# Patient Record
Sex: FEMALE | Race: WHITE | Employment: OTHER | URBAN - METROPOLITAN AREA
[De-identification: names, ages, dates, MRNs, and addresses within clinical notes are randomized per-mention and may not be internally consistent; named-entity substitution may affect disease eponyms.]

---

## 2017-01-10 DIAGNOSIS — F41.9 ANXIETY: ICD-10-CM

## 2017-01-12 RX ORDER — LORAZEPAM 1 MG/1
TABLET ORAL
Qty: 180 TAB | Refills: 1 | OUTPATIENT
Start: 2017-01-12 | End: 2017-07-12 | Stop reason: SDUPTHER

## 2017-02-21 ENCOUNTER — HOSPITAL ENCOUNTER (OUTPATIENT)
Dept: LAB | Age: 82
Discharge: HOME | End: 2017-02-21
Payer: MEDICARE

## 2017-02-21 PROCEDURE — 36415 COLL VENOUS BLD VENIPUNCTURE: CPT

## 2017-02-21 PROCEDURE — 84443 ASSAY THYROID STIM HORMONE: CPT

## 2017-02-21 PROCEDURE — 85025 COMPLETE CBC W/AUTO DIFF WBC: CPT

## 2017-02-21 PROCEDURE — 82306 VITAMIN D 25 HYDROXY: CPT

## 2017-02-21 PROCEDURE — 80061 LIPID PANEL: CPT

## 2017-02-21 PROCEDURE — 80053 COMPREHEN METABOLIC PANEL: CPT

## 2017-03-07 ENCOUNTER — HOSPITAL ENCOUNTER (OUTPATIENT)
Dept: LAB | Age: 82
Discharge: HOME | End: 2017-03-07
Payer: MEDICARE

## 2017-03-07 ENCOUNTER — OFFICE VISIT (OUTPATIENT)
Dept: INTERNAL MEDICINE CLINIC | Age: 82
End: 2017-03-07

## 2017-03-07 VITALS
WEIGHT: 110.2 LBS | RESPIRATION RATE: 18 BRPM | OXYGEN SATURATION: 98 % | SYSTOLIC BLOOD PRESSURE: 114 MMHG | DIASTOLIC BLOOD PRESSURE: 74 MMHG | BODY MASS INDEX: 22.22 KG/M2 | HEIGHT: 59 IN | TEMPERATURE: 97.2 F | HEART RATE: 96 BPM

## 2017-03-07 DIAGNOSIS — N39.498 OTHER URINARY INCONTINENCE: ICD-10-CM

## 2017-03-07 DIAGNOSIS — Z00.00 MEDICARE ANNUAL WELLNESS VISIT, SUBSEQUENT: Primary | ICD-10-CM

## 2017-03-07 DIAGNOSIS — E78.00 HYPERCHOLESTEROLEMIA: ICD-10-CM

## 2017-03-07 DIAGNOSIS — F41.9 ANXIETY: ICD-10-CM

## 2017-03-07 DIAGNOSIS — Z23 ENCOUNTER FOR IMMUNIZATION: ICD-10-CM

## 2017-03-07 DIAGNOSIS — N30.01 ACUTE CYSTITIS WITH HEMATURIA: ICD-10-CM

## 2017-03-07 DIAGNOSIS — D50.8 OTHER IRON DEFICIENCY ANEMIA: ICD-10-CM

## 2017-03-07 LAB
BILIRUB UR QL STRIP: NEGATIVE
GLUCOSE UR-MCNC: NEGATIVE MG/DL
KETONES P FAST UR STRIP-MCNC: NEGATIVE MG/DL
PH UR STRIP: 7.5 [PH] (ref 4.6–8)
PROT UR QL STRIP: NORMAL MG/DL
SP GR UR STRIP: 1.02 (ref 1–1.03)
UA UROBILINOGEN AMB POC: NORMAL (ref 0.2–1)
URINALYSIS CLARITY POC: NORMAL
URINALYSIS COLOR POC: YELLOW
URINE BLOOD POC: NORMAL
URINE LEUKOCYTES POC: NORMAL
URINE NITRITES POC: POSITIVE

## 2017-03-07 PROCEDURE — 87086 URINE CULTURE/COLONY COUNT: CPT

## 2017-03-07 PROCEDURE — 87186 SC STD MICRODIL/AGAR DIL: CPT

## 2017-03-07 PROCEDURE — 87077 CULTURE AEROBIC IDENTIFY: CPT

## 2017-03-07 RX ORDER — CIPROFLOXACIN 250 MG/1
250 TABLET, FILM COATED ORAL 2 TIMES DAILY
Qty: 14 TAB | Refills: 0 | Status: SHIPPED | OUTPATIENT
Start: 2017-03-07 | End: 2017-03-14

## 2017-03-07 NOTE — PATIENT INSTRUCTIONS
It was a pleasure to see you! As discussed:    Lab Review  Your anemia level is stable. We will repeat the labs before your next visit. Complete the stool test   Your thyroid studies are at goal. Continue your current levothyroxine dose. We will recheck the levels in 6-12 months or sooner if you develop symptoms or sudden weight changes. The remainder of your labs were normal. Some labs that may have been tested and their explanation are:  Your electrolytes, kidney & liver function (Metabolic Panel)   Anemia, blood cells (CBC)  Thyroid (TSH + T4, T3)  Hormones (prolactin, vitamin D )   Diabetes (Hemoglobin A1c)     You have a  UTI. Take the antibiotics as prescribed. I've sent  A culture of the urine to find out what bacteria is causing your symptoms and if the current antibiotics will be effective. Complete urine test in lab after treatment is done    If we need to change your medication,our office will call you. Health Maintenance   We discussed decreased mammograms to every two years continue self breast exams. Urge Incontinence in Women: Care Instructions  Your Care Instructions  Urge incontinence occurs when the need to urinate is so strong that you cannot reach the toilet in time, even when your bladder contains only a small amount of urine. This is also called overactive bladder or unstable bladder. Some women may have no warning before they leak urine. This condition does not cause major health problems, but it can be embarrassing and can affect a woman's self-esteem and confidence. Treatment can cure or improve your symptoms. Follow-up care is a key part of your treatment and safety. Be sure to make and go to all appointments, and call your doctor if you are having problems. It's also a good idea to know your test results and keep a list of the medicines you take. How can you care for yourself at home? · Take your medicines exactly as prescribed.  Call your doctor if you think you are having a problem with your medicine. You will get more details on the specific medicines your doctor prescribes. · Limit caffeine and alcoholthey stimulate urine production. · Urinate every 2 to 4 hours during waking hours, even if you feel that you do not have to go. · Do pelvic floor (Kegel) exercises, which tighten and strengthen pelvic muscles. To do Kegel exercises:  ¨ Squeeze the same muscles you would use to stop your urine. Your belly and thighs should not move. ¨ Hold the squeeze for 3 seconds, then relax for 3 seconds. ¨ Start with 3 seconds. Then add 1 second each week until you are able to squeeze for 10 seconds. ¨ Repeat the exercise 10 to 15 times for each session. Do three or more sessions each day. · Try wearing pads that absorb the leaks. · Keep skin in the genital area dry. When should you call for help? Call your doctor now or seek immediate medical care if:  · You have symptoms of a urinary infection. For example:  ¨ You have blood or pus in your urine. ¨ You have pain in your back just below your rib cage. This is called flank pain. ¨ You have a fever, chills, or body aches. ¨ It hurts to urinate. ¨ You have groin or belly pain. Watch closely for changes in your health, and be sure to contact your doctor if:  · You have a hard time urinating when your bladder feels full. · You feel like you need to urinate often. · Your bladder feels full even after you urinate. · Your symptoms are getting worse. Where can you learn more? Go to http://nikolay-kendra.info/. Enter X590 in the search box to learn more about \"Urge Incontinence in Women: Care Instructions. \"  Current as of: February 25, 2016  Content Version: 11.1  © 8036-9545 [employer]. Care instructions adapted under license by [employer] (which disclaims liability or warranty for this information).  If you have questions about a medical condition or this instruction, always ask your healthcare professional. [place] [address] any warranty or liability for your use of this information.

## 2017-03-07 NOTE — LETTER
3/7/2017 11:14 AM 
 
Ms. Mariusz Almaguer [address] [facility] [id number] Dear Mariusz Mcmahan It was a pleasure to see you during your recent visit. Thank you for completing your labs. Please find your most recent results below. Your results show: 
Lab Review Your anemia level is stable. We will repeat the labs before your next visit. Complete the stool test  
Your thyroid studies are at goal. Continue your current levothyroxine dose. We will recheck the levels in 6-12 months or sooner if you develop symptoms or sudden weight changes. The remainder of your labs were normal. Some labs that may have been tested and their explanation are: 
Your electrolytes, kidney & liver function (Metabolic Panel) Anemia, blood cells (CBC) Thyroid (TSH + T4, T3) Hormones (prolactin, vitamin D ) Diabetes (Hemoglobin A1c) Remember to sign up for [facility] so we can communicate via email and you can view your records on line. Do not hesitate to contact the office if you have any questions or concerns before your next appointment. Kind regards,  
 
 
---- Sabi Hamilton MD 
Internal Medicine/ Primary Care [facility] Internal Medicine [place] [address], [address] Office: [phone] Fax: [phone] Resulted Orders METABOLIC PANEL, COMPREHENSIVE Result Value Ref Range Glucose 81 65 - 99 mg/dL BUN 19 8 - 27 mg/dL Creatinine 0.87 0.57 - 1.00 mg/dL GFR est non-AA 59 (L) >59 mL/min/1.73 GFR est AA 68 >59 mL/min/1.73  
 BUN/Creatinine ratio 22 11 - 26 Sodium 139 134 - 144 mmol/L Potassium 4.6 3.5 - 5.2 mmol/L Chloride 100 96 - 106 mmol/L  
 CO2 23 18 - 29 mmol/L Calcium 8.8 8.7 - 10.3 mg/dL Protein, total 6.8 6.0 - 8.5 g/dL Albumin 4.1 3.5 - 4.7 g/dL GLOBULIN, TOTAL 2.7 1.5 - 4.5 g/dL A-G Ratio 1.5 1.1 - 2.5 Comment:    **Effective March 13, 2017 the reference interval** 
 for A/G Ratio will be changing to: Age                Male          Female 0 -  7 days       1.1 - 2.3       1.1 - 2.3 
          8 - 30 days       1.2 - 2.8       1.2 - 2.8 
          1 -  6 months     1.3 - 3.6       1.3 - 3.6 
   7 months -  5 years      1.5 - 2.6       1.5 - 2.6 
             > 5 years      1.2 - 2.2       1.2 - 2.2 Bilirubin, total 0.3 0.0 - 1.2 mg/dL Alk. phosphatase 55 39 - 117 IU/L  
 AST (SGOT) 22 0 - 40 IU/L  
 ALT (SGPT) 13 0 - 32 IU/L Narrative Performed at:  [address]  [id number] : Blanka Villasenor MD, Phone:  [phone] TSH 3RD GENERATION Result Value Ref Range TSH 1.890 0.450 - 4.500 uIU/mL Narrative Performed at:  [address]  [id number] : Blanka Villasenor MD, Phone:  [phone] CBC WITH AUTOMATED DIFF Result Value Ref Range WBC 3.8 3.4 - 10.8 x10E3/uL  
 RBC 4.04 3.77 - 5.28 x10E6/uL HGB 10.5 (L) 11.1 - 15.9 g/dL HCT 33.5 (L) 34.0 - 46.6 % MCV 83 79 - 97 fL  
 MCH 26.0 (L) 26.6 - 33.0 pg  
 MCHC 31.3 (L) 31.5 - 35.7 g/dL  
 RDW 19.5 (H) 12.3 - 15.4 % PLATELET 305 890 - 494 x10E3/uL NEUTROPHILS 51 % Lymphocytes 25 % MONOCYTES 21 % EOSINOPHILS 2 % BASOPHILS 1 %  
 ABS. NEUTROPHILS 1.9 1.4 - 7.0 x10E3/uL Abs Lymphocytes 1.0 0.7 - 3.1 x10E3/uL  
 ABS. MONOCYTES 0.8 0.1 - 0.9 x10E3/uL  
 ABS. EOSINOPHILS 0.1 0.0 - 0.4 x10E3/uL  
 ABS. BASOPHILS 0.0 0.0 - 0.2 x10E3/uL IMMATURE GRANULOCYTES 0 %  
 ABS. IMM. GRANS. 0.0 0.0 - 0.1 x10E3/uL Hematology comments: Note:   
   Comment:  
   Verified by microscopic examination. Narrative Performed at:  [address]  [id number] : Blanka Villasenor MD, Phone:  [phone] LIPID PANEL Result Value Ref Range Cholesterol, total 163 100 - 199 mg/dL Triglyceride 90 0 - 149 mg/dL HDL Cholesterol 55 >39 mg/dL VLDL, calculated 18 5 - 40 mg/dL LDL, calculated 90 0 - 99 mg/dL Narrative Performed at:  [address]  [id number] : Hodan Marquis MD, Phone:  [phone] VITAMIN D, 25 HYDROXY Result Value Ref Range VITAMIN D, 25-HYDROXY 37.2 30.0 - 100.0 ng/mL Comment:  
   Vitamin D deficiency has been defined by the [address] [place] [address] practice guideline as a 
level of serum 25-OH vitamin D less than 20 ng/mL (1,2). The Endocrine Society went on to further define vitamin D 
insufficiency as a level between 21 and 29 ng/mL (2). 1. IOM ([place] of Medicine). 2010. Dietary reference 
   intakes for calcium and D. [address] [place] [address]: The 
   Sunlight Foundation. 2. Wm MF, Farrukh NC, Davey FAJARDO, et al. 
   Evaluation, treatment, and prevention of vitamin D 
   deficiency: an Endocrine Society clinical practice 
   guideline. JCEM. 2011 Jul; 96(7):1911-30. Narrative Performed at:  [address]  [id number] : Hodan Marquis MD, Phone:  [phone]

## 2017-03-07 NOTE — MR AVS SNAPSHOT
Visit Information Date & Time Provider Department Dept. Phone Encounter #  
 3/7/2017 10:00 AM Gianni Gilbert MD [address] [place] [address] Internal Medicine [phone] [id number] Follow-up Instructions Return in about 3 months (around 6/7/2017) for Follow-up with breast exam . Upcoming Health Maintenance Date Due ZOSTER VACCINE AGE 60> 11/22/1987 GLAUCOMA SCREENING Q2Y 11/22/1992 Pneumococcal 65+ Low/Medium Risk (2 of 2 - PCV13) 3/27/2014 INFLUENZA AGE 9 TO ADULT 8/1/2016 MEDICARE YEARLY EXAM 3/8/2018 DTaP/Tdap/Td series (2 - Td) 2/3/2024 Allergies as of 3/7/2017  Review Complete On: 3/7/2017 By: Gianni Gilbert MD  
  
 Severity Noted Reaction Type Reactions Ciprofibrate  08/18/2015    Nausea Only Codeine  03/12/2013    Nausea Only Erythromycin  03/25/2013    Nausea Only Tetracycline  03/25/2013    Nausea Only Tramadol  03/12/2013    Other (comments) \"drunk\" Current Immunizations  Reviewed on 3/25/2013 Name Date Influenza Vaccine 9/25/2012 Pneumococcal Polysaccharide (PPSV-23) 3/27/2013  4:46 PM  
  
 Not reviewed this visit You Were Diagnosed With   
  
 Codes Comments Medicare annual wellness visit, subsequent    -  Primary ICD-10-CM: Z00.00 ICD-9-CM: V70.0 Other iron deficiency anemia     ICD-10-CM: D50.8 Anxiety     ICD-10-CM: F41.9 ICD-9-CM: 300.00 Hypercholesterolemia     ICD-10-CM: E78.00 ICD-9-CM: 272.0 Encounter for immunization     ICD-10-CM: F03 ICD-9-CM: V03.89 Other urinary incontinence     ICD-10-CM: I28.964 ICD-9-CM: 788.39 Acute cystitis with hematuria     ICD-10-CM: N30.01 
ICD-9-CM: 595.0 Vitals BP Pulse Temp Resp Height(growth percentile) Weight(growth percentile) 114/74 (BP 1 Location: Right arm, BP Patient Position: Sitting) 96 97.2 °F (36.2 °C) (Oral) 18 4' 11\" (1.499 m) 110 lb 3.2 oz (50 kg) SpO2 BMI OB Status Smoking Status 98% 22.26 kg/m2 Hysterectomy Never Smoker Vitals History BMI and BSA Data Body Mass Index Body Surface Area  
 22.26 kg/m 2 1.44 m 2 Preferred Pharmacy Pharmacy Name Phone [phone] [phone] Your Updated Medication List  
  
   
This list is accurate as of: 3/7/17 11:20 AM.  Always use your most recent med list.  
  
  
  
  
 acetaminophen 650 mg Tab Take 650 mg by mouth every six (6) hours. amitriptyline 10 mg tablet Commonly known as:  ELAVIL TAKE 1 TABLET TWICE A DAY  
  
 aspirin delayed-release 81 mg tablet Take 81 mg by mouth daily. AZOPT 1 % ophthalmic suspension Generic drug:  brinzolamide Administer 1 Drop to left eye two (2) times a day. CENTRUM SILVER Tab tablet Generic drug:  multivitamins-minerals-lutein Take 1 Tab by mouth daily. ciprofloxacin HCl 250 mg tablet Commonly known as:  CIPRO Take 1 Tab by mouth two (2) times a day for 7 days. HYDROcodone-acetaminophen 5-325 mg per tablet Commonly known as:  Suzanne Barrs Take 1 Tab by mouth every six (6) hours as needed for Pain. Max Daily Amount: 4 Tabs. ipratropium 0.06 % nasal spray Commonly known as:  ATROVENT  
2 Sprays by Both Nostrils route two (2) times a day. levothyroxine 50 mcg tablet Commonly known as:  SYNTHROID  
TAKE ONE-HALF (1/2) TABLET DAILY BEFORE BREAKFAST LORazepam 1 mg tablet Commonly known as:  ATIVAN  
1 tab by mouth every twelve hrs as needed for anxiety  
  
 pneumococcal 13 girish conj dip 0.5 mL Syrg injection Commonly known as:  PREVNAR-13  
0.5 mL by IntraMUSCular route once for 1 dose. raNITIdine 150 mg tablet Commonly known as:  ZANTAC Take 150 mg by mouth daily. senna-docusate 8.6-50 mg per tablet Commonly known as:  Joey Renny Take 1 Tab by mouth two (2) times a day. simvastatin 10 mg tablet Commonly known as:  ZOCOR  
 Take 10 mg by mouth nightly. Prescriptions Printed Refills  
 pneumococcal 13 girish conj dip (PREVNAR-13) 0.5 mL syrg injection 0 Si.5 mL by IntraMUSCular route once for 1 dose. Class: Print Route: IntraMUSCular Prescriptions Sent to Pharmacy Refills  
 ciprofloxacin HCl (CIPRO) 250 mg tablet 0 Sig: Take 1 Tab by mouth two (2) times a day for 7 days. Class: Normal  
 Pharmacy: [facility] [place] [address],[address] Ph #: [phone] Route: Oral  
  
We Performed the Following AMB POC URINALYSIS DIP STICK AUTO W/O MICRO [37273 CPT(R)] CBC WITH AUTOMATED DIFF [57321 CPT(R)] CULTURE, URINE [id number] CPT(R)] FERRITIN [26663 CPT(R)] IRON PROFILE [id number] CPT(R)] OCCULT BLOOD, IMMUNOASSAY (FIT) [id number] CPT(R)] REFERRAL TO UROLOGY [ANI938 Custom] Follow-up Instructions Return in about 3 months (around 2017) for Follow-up with breast exam . To-Do List   
 2017 Lab:  UA/M W/RFLX CULTURE, COMP Referral Information Referral ID Referred By Referred To  
  
 [id number] [facility] [address] Visits Status Start Date End Date 1 New Request 3/7/17 3/7/18 If your referral has a status of pending review or denied, additional information will be sent to support the outcome of this decision. Patient Instructions It was a pleasure to see you! As discussed: 
 
Lab Review Your anemia level is stable. We will repeat the labs before your next visit. Complete the stool test  
Your thyroid studies are at goal. Continue your current levothyroxine dose. We will recheck the levels in 6-12 months or sooner if you develop symptoms or sudden weight changes. The remainder of your labs were normal. Some labs that may have been tested and their explanation are: Your electrolytes, kidney & liver function (Metabolic Panel) Anemia, blood cells (CBC) Thyroid (TSH + T4, T3) Hormones (prolactin, vitamin D ) Diabetes (Hemoglobin A1c) You have a  UTI. Take the antibiotics as prescribed. I've sent  A culture of the urine to find out what bacteria is causing your symptoms and if the current antibiotics will be effective. Complete urine test in lab after treatment is done If we need to change your medication,our office will call you. Health Maintenance We discussed decreased mammograms to every two years continue self breast exams. Urge Incontinence in Women: Care Instructions Your Care Instructions Urge incontinence occurs when the need to urinate is so strong that you cannot reach the toilet in time, even when your bladder contains only a small amount of urine. This is also called overactive bladder or unstable bladder. Some women may have no warning before they leak urine. This condition does not cause major health problems, but it can be embarrassing and can affect a woman's self-esteem and confidence. Treatment can cure or improve your symptoms. Follow-up care is a key part of your treatment and safety. Be sure to make and go to all appointments, and call your doctor if you are having problems. It's also a good idea to know your test results and keep a list of the medicines you take. How can you care for yourself at home? · Take your medicines exactly as prescribed. Call your doctor if you think you are having a problem with your medicine. You will get more details on the specific medicines your doctor prescribes. · Limit caffeine and alcoholthey stimulate urine production. · Urinate every 2 to 4 hours during waking hours, even if you feel that you do not have to go. · Do pelvic floor (Kegel) exercises, which tighten and strengthen pelvic muscles. To do Kegel exercises: ¨ Squeeze the same muscles you would use to stop your urine. Your belly and thighs should not move. ¨ Hold the squeeze for 3 seconds, then relax for 3 seconds. ¨ Start with 3 seconds. Then add 1 second each week until you are able to squeeze for 10 seconds. ¨ Repeat the exercise 10 to 15 times for each session. Do three or more sessions each day. · Try wearing pads that absorb the leaks. · Keep skin in the genital area dry. When should you call for help? Call your doctor now or seek immediate medical care if: 
· You have symptoms of a urinary infection. For example: ¨ You have blood or pus in your urine. ¨ You have pain in your back just below your rib cage. This is called flank pain. ¨ You have a fever, chills, or body aches. ¨ It hurts to urinate. ¨ You have groin or belly pain. Watch closely for changes in your health, and be sure to contact your doctor if: 
· You have a hard time urinating when your bladder feels full. · You feel like you need to urinate often. · Your bladder feels full even after you urinate. · Your symptoms are getting worse. Where can you learn more? Go to http://nikolay-kendra.info/. Enter P596 in the search box to learn more about \"Urge Incontinence in Women: Care Instructions. \" Current as of: February 25, 2016 Content Version: 11.1 © 5233-9632 [employer]. Care instructions adapted under license by [employer] (which disclaims liability or warranty for this information). If you have questions about a medical condition or this instruction, always ask your healthcare professional. [address] any warranty or liability for your use of this information. Introducing [facility]! Mary Kate Villafana introduces MysteryD patient portal. Now you can access parts of your medical record, email your doctor's office, and request medication refills online.    
 
1. In your internet browser, go to [email or web address] 2. Click on the First Time User? Click Here link in the Sign In box. You will see the New Member Sign Up page. 3. Enter your Crazidea Access Code exactly as it appears below. You will not need to use this code after youve completed the sign-up process. If you do not sign up before the expiration date, you must request a new code. · Crazidea Access Code: [email or web address] Expires: 6/5/2017  9:41 AM 
 
4. Enter the last four digits of your Social Security Number (xxxx) and Date of Birth (mm/dd/yyyy) as indicated and click Submit. You will be taken to the next sign-up page. 5. Create a Crazidea ID. This will be your Crazidea login ID and cannot be changed, so think of one that is secure and easy to remember. 6. Create a Crazidea password. You can change your password at any time. 7. Enter your Password Reset Question and Answer. This can be used at a later time if you forget your password. 8. Enter your e-mail address. You will receive e-mail notification when new information is available in [address] 9. Click Sign Up. You can now view and download portions of your medical record. 10. Click the Download Summary menu link to download a portable copy of your medical information. If you have questions, please visit the Frequently Asked Questions section of the Crazidea website. Remember, Crazidea is NOT to be used for urgent needs. For medical emergencies, dial 911. Now available from your iPhone and Android! Please provide this summary of care documentation to your next provider. Your primary care clinician is listed as Dieter Stuart. If you have any questions after today's visit, please call [phone].

## 2017-03-07 NOTE — PROGRESS NOTES
HISTORY OF PRESENT ILLNESS  Makenzie Darling is a 80 y.o. female. HPI  Breast Cancer Screening  Makenzie Darling asks if she needs breast cancer screening. Daughter  at 40 of BCA. Makenzie Darling has h/o breast biopsy. Mammograms since have been wnl. Last mammogram  at [facility].      Hypothyroidism  Patient is seen for followup of hypothyroidism. Thyroid ROS: denies fatigue, weight changes, heat/cold intolerance, bowel/skin changes or CVS symptoms. Urinary Incontinence  She  is an 80 y.o. female seen for  incontinence problems. Symptoms were first noted several months ago. Symptoms include: frequent urination ( 10 times daily), urge to urinate recurs again shortly following micturition and urge to urinate comes with little or no warning. She usually wears mini pads for protection. Previous treatment includes: Kegal exercise. Keyana Valdivia complains of frequent urination, and She denies hematuria, dysuria, recurrent urinary tract infections, vaginal burning and neurologic disease. This is a Subsequent Medicare Annual Wellness Visit providing Personalized Prevention Plan Services (PPPS) (Performed 12 months after initial AWV and PPPS )    I have reviewed the patient's medical history in detail and updated the computerized patient record. History     Past Medical History:   Diagnosis Date    Arrhythmia     paroxysmal a-fib    Arthritis     Asthma     GERD (gastroesophageal reflux disease)     Glaucoma     Nausea & vomiting     Other ill-defined conditions(799.89)     vertigo    Other ill-defined conditions(799.89)     elevated cholesterol    Psychiatric disorder     anxiety    PUD (peptic ulcer disease)     yrs ago    Scoliosis     Thyroid disease     hypothyroid    Ulcer disease 2014    Many years ago.  No surgery needed      Unspecified adverse effect of anesthesia     nausea      Past Surgical History:   Procedure Laterality Date    HX CATARACT REMOVAL Bilateral     HX DILATION AND CURETTAGE      HX HYSTERECTOMY      HX ORTHOPAEDIC  3/25/13    LEFT TOTAL KNEE REPLACEMENT    HX OTHER SURGICAL      eye brow lift     Current Outpatient Prescriptions   Medication Sig Dispense Refill    LORazepam (ATIVAN) 1 mg tablet 1 tab by mouth every twelve hrs as needed for anxiety 180 Tab 1    levothyroxine (SYNTHROID) 50 mcg tablet TAKE ONE-HALF (1/2) TABLET DAILY BEFORE BREAKFAST 45 Tab 1    amitriptyline (ELAVIL) 10 mg tablet TAKE 1 TABLET TWICE A  Tab 3    HYDROcodone-acetaminophen (NORCO) 5-325 mg per tablet Take 1 Tab by mouth every six (6) hours as needed for Pain. Max Daily Amount: 4 Tabs. 15 Tab 0    aspirin delayed-release 81 mg tablet Take 81 mg by mouth daily.  ranitidine (ZANTAC) 150 mg tablet Take 150 mg by mouth daily.  acetaminophen 650 mg Tab Take 650 mg by mouth every six (6) hours. (Patient taking differently: Take 650 mg by mouth every six (6) hours as needed.) 1 Tab 0    senna-docusate (PERICOLACE) 8.6-50 mg per tablet Take 1 Tab by mouth two (2) times a day. (Patient taking differently: Take 1 Tab by mouth as needed.) 60 Tab 0    ipratropium (ATROVENT) 0.06 % nasal spray 2 Sprays by Both Nostrils route two (2) times a day.  brinzolamide (AZOPT) 1 % ophthalmic suspension Administer 1 Drop to left eye two (2) times a day.  simvastatin (ZOCOR) 10 mg tablet Take 10 mg by mouth nightly.  multivitamins-minerals-lutein (CENTRUM SILVER) Tab Take 1 Tab by mouth daily.        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 No    Do you average more than 7 drinks per week? No      Functional Ability and Level of Safety:     Hearing Loss   mild-to-moderate    Activities of Daily Living   Self-care. Requires assistance with: no ADLs    Fall Risk     Fall Risk Assessment, last 12 mths 3/7/2017   Able to walk? Yes   Fall in past 12 months? No   Fall with injury? -   Number of falls in past 12 months -   Fall Risk Score -     Abuse Screen   Patient is not abused Still lives in [place] [address]. Wears life alert. Review of Systems   Constitutional: Negative for chills, fever and weight loss. HENT: Negative for congestion and sore throat. Eyes: Negative for blurred vision and double vision. Respiratory: Negative for cough and shortness of breath. Cardiovascular: Negative for chest pain, orthopnea and leg swelling. Gastrointestinal: Negative for abdominal pain, blood in stool, constipation, diarrhea, heartburn, nausea and vomiting. Genitourinary: Negative for frequency and urgency. Musculoskeletal: Negative for joint pain and myalgias. Neurological: Negative for dizziness, tremors, weakness and headaches. Endo/Heme/Allergies: Does not bruise/bleed easily. Psychiatric/Behavioral: Negative for depression and suicidal ideas. Physical Exam   Constitutional: She is oriented to person, place, and time. No distress.    HENT:   Right Ear: Tympanic membrane, external ear and ear canal normal. Left Ear: Tympanic membrane, external ear and ear canal normal.   Mouth/Throat: No oropharyngeal exudate or posterior oropharyngeal edema. Cardiovascular: Normal rate, regular rhythm and normal heart sounds. Pulmonary/Chest: Breath sounds normal. No respiratory distress. She has no wheezes. She has no rales. Musculoskeletal: She exhibits no edema. Neurological: She is alert and oriented to person, place, and time. Psychiatric: She has a normal mood and affect. Evaluation of Cognitive Function:  Mood/affect:  happy  Appearance: age appropriate and well dressed  Family member/caregiver input: n/a         Patient Care Team:  Jenna Agarwal MD as PCP - General (Internal Medicine)      Lab Results  Component Value Date/Time   WBC 3.8 02/21/2017 10:36 AM   HGB 10.5 02/21/2017 10:36 AM   HCT 33.5 02/21/2017 10:36 AM   PLATELET 786 86/85/3305 10:36 AM   MCV 83 02/21/2017 10:36 AM       Lab Results  Component Value Date/Time   Hemoglobin A1c 5.3 03/12/2013 08:39 AM   Glucose 81 02/21/2017 10:36 AM   Glucose (POC) 79 03/25/2013 08:40 AM   LDL, calculated 90 02/21/2017 10:36 AM   Creatinine 0.87 02/21/2017 10:36 AM      Lab Results  Component Value Date/Time   Cholesterol, total 163 02/21/2017 10:36 AM   HDL Cholesterol 55 02/21/2017 10:36 AM   LDL, calculated 90 02/21/2017 10:36 AM   Triglyceride 90 02/21/2017 10:36 AM       Lab Results  Component Value Date/Time   ALT (SGPT) 13 02/21/2017 10:36 AM   AST (SGOT) 22 02/21/2017 10:36 AM   Alk.  phosphatase 55 02/21/2017 10:36 AM   Bilirubin, total 0.3 02/21/2017 10:36 AM       Lab Results  Component Value Date/Time   GFR est AA 68 02/21/2017 10:36 AM   GFR est non-AA 59 02/21/2017 10:36 AM   Creatinine 0.87 02/21/2017 10:36 AM   BUN 19 02/21/2017 10:36 AM   Sodium 139 02/21/2017 10:36 AM   Potassium 4.6 02/21/2017 10:36 AM   Chloride 100 02/21/2017 10:36 AM   CO2 23 02/21/2017 10:36 AM      Lab Results  Component Value Date/Time   TSH 1.890 02/21/2017 10:36 AM   T4, Total 7.0 08/27/2015 09:20 AM      Lab Results   Component Value Date/Time    Glucose 81 02/21/2017 10:36 AM    Glucose (POC) 79 03/25/2013 08:40 AM      Recent Results (from the past 12 hour(s))   AMB POC URINALYSIS DIP STICK AUTO W/O MICRO    Collection Time: 03/07/17 11:15 AM   Result Value Ref Range    Color (UA POC) Yellow     Clarity (UA POC) Cloudy     Glucose (UA POC) Negative Negative    Bilirubin (UA POC) Negative Negative    Ketones (UA POC) Negative Negative    Specific gravity (UA POC) 1.020 1.001 - 1.035    Blood (UA POC) Trace Negative    pH (UA POC) 7.5 4.6 - 8.0    Protein (UA POC) 1+ Negative mg/dL    Urobilinogen (UA POC) 0.2 mg/dL 0.2 - 1    Nitrites (UA POC) Positive Negative    Leukocyte esterase (UA POC) 3+ Negative       ASSESSMENT and PLAN  Advice/Referrals/Counseling   Education and counseling provided:  Health Maintenance   Topic Date Due    DTaP/Tdap/Td series (1 - Tdap) 11/22/1948    ZOSTER VACCINE AGE 60>  11/22/1987    GLAUCOMA SCREENING Q2Y  11/22/1992    OSTEOPOROSIS SCREENING (DEXA)  11/22/1992    Pneumococcal 65+ Low/Medium Risk (2 of 2 - PCV13) 03/27/2014    INFLUENZA AGE 9 TO ADULT  08/01/2016    MEDICARE YEARLY EXAM  01/19/2017         Assessment/Plan   Dwight Mendoza was seen today for annual wellness visit. Diagnoses and all orders for this visit:    Medicare annual wellness visit, subsequent- Health Maintenance reviewed  Your anemia level is stable. We will repeat the labs before your next visit. Complete the stool test   Your thyroid studies are at goal. Continue your current levothyroxine dose. We will recheck the levels in 6-12 months or sooner if you develop symptoms or sudden weight changes.    The remainder of your labs were normal. Some labs that may have been tested and their explanation are:  Your electrolytes, kidney & liver function (Metabolic Panel)   Anemia, blood cells (CBC)  Thyroid (TSH + T4, T3)  Hormones (prolactin, vitamin D ) Diabetes (Hemoglobin A1c)     Other iron deficiency anemia  -     OCCULT BLOOD, IMMUNOASSAY (FIT)  -     CBC WITH AUTOMATED DIFF  -     IRON PROFILE  -     FERRITIN    Anxiety- stable. Continue Lorazepam.     Hypercholesterolemia    Encounter for immunization  -     pneumococcal 13 girish conj dip (PREVNAR-13) 0.5 mL syrg injection; 0.5 mL by IntraMUSCular route once for 1 dose. -     OCCULT BLOOD, IMMUNOASSAY (FIT)    Other urinary incontinence  -     REFERRAL TO UROLOGY  -     AMB POC URINALYSIS DIP STICK AUTO W/O MICRO    Acute cystitis with hematuria  -     ciprofloxacin HCl (CIPRO) 250 mg tablet; Take 1 Tab by mouth two (2) times a day for 7 days. -     CULTURE, URINE  -     UA/M W/RFLX CULTURE, COMP; Future      Follow-up Disposition:  Return in about 3 months (around 6/7/2017) for Follow-up with breast exam .    Medication risks/benefits/costs/interactions/alternatives discussed with patient. Natalia Jennings  was given an after visit summary which includes diagnoses, current medications, & vitals. she expressed understanding with the diagnosis and plan.

## 2017-03-09 LAB — BACTERIA UR CULT: ABNORMAL

## 2017-03-09 NOTE — PROGRESS NOTES
Patient has been informed per drs result notes and recommendations. She will come in and have repeat urinalysis after abx treatment completion.

## 2017-03-09 NOTE — PROGRESS NOTES
Please call patient and:  -Check if UTI sx improving  -Let her know the antibiotics should effectively treat her UTI   Remember to complete the urine test after she is done with the antibiotics   Let us know if she  is not feeling better after antibiotics or her  sx worsen. Thanks!

## 2017-03-14 ENCOUNTER — TELEPHONE (OUTPATIENT)
Dept: INTERNAL MEDICINE CLINIC | Age: 82
End: 2017-03-14

## 2017-03-14 DIAGNOSIS — N30.01 ACUTE CYSTITIS WITH HEMATURIA: ICD-10-CM

## 2017-03-14 NOTE — TELEPHONE ENCOUNTER
Patient called to let Dr. Satya Stiles know she could not take the last couple of Cipro tablets. States she could not sleep, was jittery and was dropping things. Since she stopped taking the med, those side effects stopped. She cannot get in for the follow-up UA until next week.

## 2017-03-15 NOTE — TELEPHONE ENCOUNTER
Spoke with patient who states she had 2 days left of her Cipro which caused her to feel jittery and keeping her awake at night. She currently has no urinary symptoms and decided to stop the Cipro because she needed to sleep.

## 2017-03-20 ENCOUNTER — TELEPHONE (OUTPATIENT)
Dept: INTERNAL MEDICINE CLINIC | Age: 82
End: 2017-03-20

## 2017-03-20 NOTE — TELEPHONE ENCOUNTER
[phone] pt says that she had a urinalysis done at her appt on 3/7/17 and was told to come back in for a repeat, but she was not able to return and wants to know if dr David Sanchez still wants her to have a repeat. She says she thinks she get a ride tomorrow if she needs to come in.

## 2017-03-21 ENCOUNTER — HOSPITAL ENCOUNTER (OUTPATIENT)
Dept: LAB | Age: 82
Discharge: HOME | End: 2017-03-21
Payer: MEDICARE

## 2017-03-21 PROCEDURE — 82270 OCCULT BLOOD FECES: CPT

## 2017-03-21 NOTE — TELEPHONE ENCOUNTER
Patient does not have any trouble with urination and will see the person that brings her to the doctor's appts until next week.  She will call us if she has any additional symptoms

## 2017-03-26 LAB — HEMOCCULT STL QL IA: NEGATIVE

## 2017-03-27 ENCOUNTER — DOCUMENTATION ONLY (OUTPATIENT)
Dept: INTERNAL MEDICINE CLINIC | Age: 82
End: 2017-03-27

## 2017-04-03 ENCOUNTER — TELEPHONE (OUTPATIENT)
Dept: INTERNAL MEDICINE CLINIC | Age: 82
End: 2017-04-03

## 2017-04-03 NOTE — TELEPHONE ENCOUNTER
[phone] pt says that last time dr Nicko Galindo gave her a rx for a uti she was not able to finish it, she thinks that the problem has returned. She is requesting a rx be sent to her pharm for her. She says that the med given before did not really agree with her.

## 2017-04-04 ENCOUNTER — OFFICE VISIT (OUTPATIENT)
Dept: INTERNAL MEDICINE CLINIC | Age: 82
End: 2017-04-04

## 2017-04-04 ENCOUNTER — HOSPITAL ENCOUNTER (OUTPATIENT)
Dept: LAB | Age: 82
Discharge: HOME | End: 2017-04-04
Payer: MEDICARE

## 2017-04-04 VITALS
OXYGEN SATURATION: 97 % | WEIGHT: 110 LBS | BODY MASS INDEX: 22.18 KG/M2 | SYSTOLIC BLOOD PRESSURE: 100 MMHG | RESPIRATION RATE: 18 BRPM | TEMPERATURE: 96 F | DIASTOLIC BLOOD PRESSURE: 60 MMHG | HEART RATE: 95 BPM | HEIGHT: 59 IN

## 2017-04-04 DIAGNOSIS — N30.01 ACUTE CYSTITIS WITH HEMATURIA: Primary | ICD-10-CM

## 2017-04-04 DIAGNOSIS — R35.0 URINE FREQUENCY: ICD-10-CM

## 2017-04-04 LAB
BILIRUB UR QL STRIP: NEGATIVE
GLUCOSE UR-MCNC: NEGATIVE MG/DL
KETONES P FAST UR STRIP-MCNC: NEGATIVE MG/DL
PH UR STRIP: 5.5 [PH] (ref 4.6–8)
PROT UR QL STRIP: NORMAL MG/DL
SP GR UR STRIP: 1.02 (ref 1–1.03)
UA UROBILINOGEN AMB POC: NORMAL (ref 0.2–1)
URINALYSIS CLARITY POC: CLEAR
URINALYSIS COLOR POC: YELLOW
URINE BLOOD POC: NORMAL
URINE LEUKOCYTES POC: NORMAL
URINE NITRITES POC: NEGATIVE

## 2017-04-04 PROCEDURE — 87077 CULTURE AEROBIC IDENTIFY: CPT

## 2017-04-04 PROCEDURE — 87088 URINE BACTERIA CULTURE: CPT

## 2017-04-04 PROCEDURE — 87086 URINE CULTURE/COLONY COUNT: CPT

## 2017-04-04 PROCEDURE — 87186 SC STD MICRODIL/AGAR DIL: CPT

## 2017-04-04 RX ORDER — SULFAMETHOXAZOLE AND TRIMETHOPRIM 800; 160 MG/1; MG/1
1 TABLET ORAL 2 TIMES DAILY
Qty: 14 TAB | Refills: 0 | Status: SHIPPED | OUTPATIENT
Start: 2017-04-04 | End: 2017-04-11

## 2017-04-04 NOTE — PROGRESS NOTES
HISTORY OF PRESENT ILLNESS  Jose Alejandro Fontaine is a 80 y.o. female. Bladder Infection    This is a recurrent problem. The current episode started more than 1 week ago. The patient is experiencing no pain. There has been no fever. She is not sexually active. Associated symptoms include frequency. Pertinent negatives include no chills, no sweats, no nausea, no hematuria, no hesitancy and no urgency. She has tried nothing for the symptoms. Her past medical history is significant for recurrent UTIs. Review of Systems   Constitutional: Negative for chills. Gastrointestinal: Negative for nausea. Genitourinary: Positive for frequency. Negative for hematuria, hesitancy and urgency. Patient Active Problem List    Diagnosis Date Noted    Paroxysmal a-fib ([facility] [address] 09/17/2014    Glaucoma 09/17/2014    Hypercholesterolemia 09/17/2014    Anxiety 09/17/2014    Gas 09/17/2014    DJD (degenerative joint disease) of knee 03/26/2013       Current Outpatient Prescriptions   Medication Sig Dispense Refill    LORazepam (ATIVAN) 1 mg tablet 1 tab by mouth every twelve hrs as needed for anxiety 180 Tab 1    levothyroxine (SYNTHROID) 50 mcg tablet TAKE ONE-HALF (1/2) TABLET DAILY BEFORE BREAKFAST 45 Tab 1    HYDROcodone-acetaminophen (NORCO) 5-325 mg per tablet Take 1 Tab by mouth every six (6) hours as needed for Pain. Max Daily Amount: 4 Tabs. 15 Tab 0    aspirin delayed-release 81 mg tablet Take 81 mg by mouth daily.  ranitidine (ZANTAC) 150 mg tablet Take 150 mg by mouth daily.  acetaminophen 650 mg Tab Take 650 mg by mouth every six (6) hours. (Patient taking differently: Take 650 mg by mouth every six (6) hours as needed.) 1 Tab 0    senna-docusate (PERICOLACE) 8.6-50 mg per tablet Take 1 Tab by mouth two (2) times a day. (Patient taking differently: Take 1 Tab by mouth as needed.) 60 Tab 0    ipratropium (ATROVENT) 0.06 % nasal spray 2 Sprays by Both Nostrils route two (2) times a day.       brinzolamide (AZOPT) 1 % ophthalmic suspension Administer 1 Drop to left eye two (2) times a day.  simvastatin (ZOCOR) 10 mg tablet Take 10 mg by mouth nightly.  multivitamins-minerals-lutein (CENTRUM SILVER) Tab Take 1 Tab by mouth daily.  amitriptyline (ELAVIL) 10 mg tablet TAKE 1 TABLET TWICE A  Tab 3       Allergies   Allergen Reactions    Ciprofibrate Nausea Only    Codeine Nausea Only    Erythromycin Nausea Only    Tetracycline Nausea Only    Tramadol Other (comments)     \"drunk\"      Visit Vitals    /60 (BP 1 Location: Right arm, BP Patient Position: Sitting)    Pulse 95    Temp 96 °F (35.6 °C) (Oral)    Resp 18    Ht 4' 11\" (1.499 m)    Wt 110 lb (49.9 kg)    SpO2 97%    BMI 22.22 kg/m2       Physical Exam   Constitutional: She appears well-developed and well-nourished. HENT:   Mouth/Throat: Oropharynx is clear and moist.   Eyes: Conjunctivae are normal.   Cardiovascular: Normal rate, regular rhythm and normal heart sounds. Pulmonary/Chest: Effort normal and breath sounds normal.   Abdominal: Soft. There is no rebound and no CVA tenderness. Musculoskeletal: She exhibits no edema. Skin: Skin is warm and dry. Psychiatric: She has a normal mood and affect. ASSESSMENT and PLAN  Jac Esteves was seen today for urinary frequency. Diagnoses and all orders for this visit:    Acute cystitis with hematuria- recurrent. UCx sent. Red flags to warrant ER or earlier clinical evaluation reviewed. See AVS for full details of plan and patient discussion.     -     trimethoprim-sulfamethoxazole (BACTRIM DS, SEPTRA DS) 160-800 mg per tablet; Take 1 Tab by mouth two (2) times a day for 7 days. -     CULTURE, URINE      Follow-up Disposition:  Return if symptoms worsen or fail to improve before next appointment. Medication risks/benefits/costs/interactions/alternatives discussed with patient.   Jose Alejandro Minal  was given an after visit summary which includes diagnoses, current medications, & vitals. she expressed understanding with the diagnosis and plan.

## 2017-04-04 NOTE — PATIENT INSTRUCTIONS
It was a pleasure to see you! As discussed: You have a  UTI. Take the antibiotics as prescribed. I've sent  A culture of the urine to find out what bacteria is causing your symptoms and if the current antibiotics will be effective. If we need to change your medication,our office will call you. Urinary Tract Infection in Women: Care Instructions  Your Care Instructions    A urinary tract infection, or UTI, is a general term for an infection anywhere between the kidneys and the urethra (where urine comes out). Most UTIs are bladder infections. They often cause pain or burning when you urinate. UTIs are caused by bacteria and can be cured with antibiotics. Be sure to complete your treatment so that the infection goes away. Follow-up care is a key part of your treatment and safety. Be sure to make and go to all appointments, and call your doctor if you are having problems. It's also a good idea to know your test results and keep a list of the medicines you take. How can you care for yourself at home? · Take your antibiotics as directed. Do not stop taking them just because you feel better. You need to take the full course of antibiotics. · Drink extra water and other fluids for the next day or two. This may help wash out the bacteria that are causing the infection. (If you have kidney, heart, or liver disease and have to limit fluids, talk with your doctor before you increase your fluid intake.)  · Avoid drinks that are carbonated or have caffeine. They can irritate the bladder. · Urinate often. Try to empty your bladder each time. · To relieve pain, take a hot bath or lay a heating pad set on low over your lower belly or genital area. Never go to sleep with a heating pad in place. To prevent UTIs  · Drink plenty of water each day. This helps you urinate often, which clears bacteria from your system.  (If you have kidney, heart, or liver disease and have to limit fluids, talk with your doctor before you increase your fluid intake.)  · Urinate when you need to. · Urinate right after you have sex. · Change sanitary pads often. · Avoid douches, bubble baths, feminine hygiene sprays, and other feminine hygiene products that have deodorants. · After going to the bathroom, wipe from front to back. When should you call for help? Call your doctor now or seek immediate medical care if:  · Symptoms such as fever, chills, nausea, or vomiting get worse or appear for the first time. · You have new pain in your back just below your rib cage. This is called flank pain. · There is new blood or pus in your urine. · You have any problems with your antibiotic medicine. Watch closely for changes in your health, and be sure to contact your doctor if:  · You are not getting better after taking an antibiotic for 2 days. · Your symptoms go away but then come back. Where can you learn more? Go to http://nikolay-kendra.info/. Enter Z466 in the search box to learn more about \"Urinary Tract Infection in Women: Care Instructions. \"  Current as of: November 28, 2016  Content Version: 11.2  © 6731-6582 [employer]. Care instructions adapted under license by [employer] (which disclaims liability or warranty for this information). If you have questions about a medical condition or this instruction, always ask your healthcare professional. [address] any warranty or liability for your use of this information.

## 2017-04-04 NOTE — MR AVS SNAPSHOT
Visit Information Date & Time Provider Department Dept. Phone Encounter #  
 4/4/2017 11:45 AM Lina Neely MD [facility] Internal Medicine [phone] [id number] Follow-up Instructions Return if symptoms worsen or fail to improve before next appointment. Your Appointments 6/7/2017 11:30 AM  
ROUTINE CARE with Lina Neely MD  
[facility] Internal Medicine [facility]) Appt Note: f/u   
 [address] [facility] [address]  
[address] [address] [address] Upcoming Health Maintenance Date Due  
 GLAUCOMA SCREENING Q2Y 11/22/1992 MEDICARE YEARLY EXAM 3/8/2018 DTaP/Tdap/Td series (2 - Td) 2/3/2024 Allergies as of 4/4/2017  Review Complete On: 4/4/2017 By: Jennifer Lundy Severity Noted Reaction Type Reactions Ciprofibrate  08/18/2015    Nausea Only Codeine  03/12/2013    Nausea Only Erythromycin  03/25/2013    Nausea Only Tetracycline  03/25/2013    Nausea Only Tramadol  03/12/2013    Other (comments) \"drunk\" Current Immunizations  Reviewed on 3/25/2013 Name Date Influenza Vaccine 9/25/2012 Pneumococcal Polysaccharide (PPSV-23) 3/27/2013  4:46 PM  
  
 Not reviewed this visit You Were Diagnosed With   
  
 Codes Comments Acute cystitis with hematuria    -  Primary ICD-10-CM: N30.01 
ICD-9-CM: 595.0 Urine frequency     ICD-10-CM: R35.0 ICD-9-CM: 788.41 Vitals BP Pulse Temp Resp Height(growth percentile) Weight(growth percentile) 100/60 (BP 1 Location: Right arm, BP Patient Position: Sitting) 95 96 °F (35.6 °C) (Oral) 18 4' 11\" (1.499 m) 110 lb (49.9 kg) SpO2 BMI OB Status Smoking Status 92% 22.22 kg/m2 Hysterectomy Never Smoker Vitals History BMI and BSA Data Body Mass Index Body Surface Area  
 22.22 kg/m 2 1.44 m 2 Preferred Pharmacy Pharmacy Name Phone [facility], [address] [phone] Your Updated Medication List  
  
   
This list is accurate as of: 4/4/17 12:20 PM.  Always use your most recent med list.  
  
  
  
  
 acetaminophen 650 mg Tab Take 650 mg by mouth every six (6) hours. amitriptyline 10 mg tablet Commonly known as:  ELAVIL TAKE 1 TABLET TWICE A DAY  
  
 aspirin delayed-release 81 mg tablet Take 81 mg by mouth daily. AZOPT 1 % ophthalmic suspension Generic drug:  brinzolamide Administer 1 Drop to left eye two (2) times a day. CENTRUM SILVER Tab tablet Generic drug:  multivitamins-minerals-lutein Take 1 Tab by mouth daily. HYDROcodone-acetaminophen 5-325 mg per tablet Commonly known as:   Proper Take 1 Tab by mouth every six (6) hours as needed for Pain. Max Daily Amount: 4 Tabs. ipratropium 0.06 % nasal spray Commonly known as:  ATROVENT  
2 Sprays by Both Nostrils route two (2) times a day. levothyroxine 50 mcg tablet Commonly known as:  SYNTHROID  
TAKE ONE-HALF (1/2) TABLET DAILY BEFORE BREAKFAST LORazepam 1 mg tablet Commonly known as:  ATIVAN  
1 tab by mouth every twelve hrs as needed for anxiety  
  
 raNITIdine 150 mg tablet Commonly known as:  ZANTAC Take 150 mg by mouth daily. senna-docusate 8.6-50 mg per tablet Commonly known as:  Jeral Bevel Take 1 Tab by mouth two (2) times a day. simvastatin 10 mg tablet Commonly known as:  ZOCOR Take 10 mg by mouth nightly. trimethoprim-sulfamethoxazole 160-800 mg per tablet Commonly known as:  BACTRIM DS, SEPTRA DS Take 1 Tab by mouth two (2) times a day for 7 days. Prescriptions Sent to Pharmacy Refills  
 trimethoprim-sulfamethoxazole (BACTRIM DS, SEPTRA DS) 160-800 mg per tablet 0 Sig: Take 1 Tab by mouth two (2) times a day for 7 days.   
 Class: Normal  
 [facility]acy: [id number] [address] Ph #: [phone] Route: Oral  
  
We Performed the Following AMB POC URINALYSIS DIP STICK AUTO W/ MICRO [83690 CPT(R)] CULTURE, URINE [id number] CPT(R)] UA/M W/RFLX CULTURE, COMP [12607 CPT(R)] Follow-up Instructions Return if symptoms worsen or fail to improve before next appointment. Patient Instructions It was a pleasure to see you! As discussed: You have a  UTI. Take the antibiotics as prescribed. I've sent  A culture of the urine to find out what bacteria is causing your symptoms and if the current antibiotics will be effective. If we need to change your medication,our office will call you. Urinary Tract Infection in Women: Care Instructions Your Care Instructions A urinary tract infection, or UTI, is a general term for an infection anywhere between the kidneys and the urethra (where urine comes out). Most UTIs are bladder infections. They often cause pain or burning when you urinate. UTIs are caused by bacteria and can be cured with antibiotics. Be sure to complete your treatment so that the infection goes away. Follow-up care is a key part of your treatment and safety. Be sure to make and go to all appointments, and call your doctor if you are having problems. It's also a good idea to know your test results and keep a list of the medicines you take. How can you care for yourself at home? · Take your antibiotics as directed. Do not stop taking them just because you feel better. You need to take the full course of antibiotics. · Drink extra water and other fluids for the next day or two. This may help wash out the bacteria that are causing the infection. (If you have kidney, heart, or liver disease and have to limit fluids, talk with your doctor before you increase your fluid intake.) · Avoid drinks that are carbonated or have caffeine. They can irritate the bladder. · Urinate often. Try to empty your bladder each time. · To relieve pain, take a hot bath or lay a heating pad set on low over your lower belly or genital area. Never go to sleep with a heating pad in place. To prevent UTIs · Drink plenty of water each day. This helps you urinate often, which clears bacteria from your system. (If you have kidney, heart, or liver disease and have to limit fluids, talk with your doctor before you increase your fluid intake.) · Urinate when you need to. · Urinate right after you have sex. · Change sanitary pads often. · Avoid douches, bubble baths, feminine hygiene sprays, and other feminine hygiene products that have deodorants. · After going to the bathroom, wipe from front to back. When should you call for help? Call your doctor now or seek immediate medical care if: · Symptoms such as fever, chills, nausea, or vomiting get worse or appear for the first time. · You have new pain in your back just below your rib cage. This is called flank pain. · There is new blood or pus in your urine. · You have any problems with your antibiotic medicine. Watch closely for changes in your health, and be sure to contact your doctor if: 
· You are not getting better after taking an antibiotic for 2 days. · Your symptoms go away but then come back. Where can you learn more? Go to http://nikolay-kendra.info/. Enter B603 in the search box to learn more about \"Urinary Tract Infection in Women: Care Instructions. \" Current as of: November 28, 2016 Content Version: 11.2 © 9895-2518 Open Wager. Care instructions adapted under license by Clerk (which disclaims liability or warranty for this information). If you have questions about a medical condition or this instruction, always ask your healthcare professional. [address] any warranty or liability for your use of this information. Introducing [facility] [place] & HEALTH SERVICES! [employer] introduces Mobile Authentication patient portal. Now you can access parts of your medical record, email your doctor's office, and request medication refills online. 1. In your internet browser, go to [email or web address] 2. Click on the First Time User? Click Here link in the Sign In box. You will see the New Member Sign Up page. 3. Enter your Mobile Authentication Access Code exactly as it appears below. You will not need to use this code after youve completed the sign-up process. If you do not sign up before the expiration date, you must request a new code. · Mobile Authentication Access Code: [email or web address] Expires: 6/5/2017 10:41 AM 
 
4. Enter the last four digits of your Social Security Number (xxxx) and Date of Birth (mm/dd/yyyy) as indicated and click Submit. You will be taken to the next sign-up page. 5. Create a Mobile Authentication ID. This will be your Mobile Authentication login ID and cannot be changed, so think of one that is secure and easy to remember. 6. Create a Mobile Authentication password. You can change your password at any time. 7. Enter your Password Reset Question and Answer. This can be used at a later time if you forget your password. 8. Enter your e-mail address. You will receive e-mail notification when new information is available in [address] 9. Click Sign Up. You can now view and download portions of your medical record. 10. Click the Download Summary menu link to download a portable copy of your medical information. If you have questions, please visit the Frequently Asked Questions section of the Mobile Authentication website. Remember, Mobile Authentication is NOT to be used for urgent needs. For medical emergencies, dial 911. Now available from your iPhone and Android! Please provide this summary of care documentation to your next provider. Your primary care clinician is listed as Monica Innocent. If you have any questions after today's visit, please call [phone].

## 2017-04-06 LAB — BACTERIA UR CULT: ABNORMAL

## 2017-04-07 NOTE — PROGRESS NOTES
Please call patient and:  -Check if UTI sx improving  -Let her know the antibiotics should effectively treat her UTI   Let us know if she  is not feeling better after antibiotics or her  sx worsen. Thanks!

## 2017-04-08 ENCOUNTER — TELEPHONE (OUTPATIENT)
Dept: INTERNAL MEDICINE CLINIC | Age: 82
End: 2017-04-08

## 2017-04-08 NOTE — TELEPHONE ENCOUNTER
ON CALL NOTE:  Pt reports has been on bactrim x 4 days now and having issues w/ taking meds. Has upset her stomach and gagging when taking meds. Chart reviewed. Did have a UTI. Only options to treat are Bactrim & Augmentin. Offered to change to Augmentin but she has no way of getting meds from the pharmacy. She will try to take one more tab this evening, making it 5 days of bactrim and reviewed this will likely be sufficient. Reviewed to take w/ food. Red flags were reviewed & discussed with the patient to notify me. She verbalized understanding. Will call on Monday if any issues.

## 2017-04-12 ENCOUNTER — TELEPHONE (OUTPATIENT)
Dept: INTERNAL MEDICINE CLINIC | Age: 82
End: 2017-04-12

## 2017-04-13 NOTE — TELEPHONE ENCOUNTER
Patient had office visit for UTI, prescribed bactrim. Last week spoke with on call provider, not feeling well. Gagging when taking medication but able to swallow. Was instructed to complete 8 day of antibiotic,which  patient has completed. Drinking plenty of water and cranberry juice. Pt denies urinary sx or pain. Feeling fatigue but getting better daily. Pt has no transportation, have to reach out to son who brings to appointments. Informed patient make f/u appointment to make sure infection clear. If sx worsen, go to ER. Pt verbalized understanding.

## 2017-04-18 ENCOUNTER — OFFICE VISIT (OUTPATIENT)
Dept: INTERNAL MEDICINE CLINIC | Age: 82
End: 2017-04-18

## 2017-04-18 VITALS
BODY MASS INDEX: 22.18 KG/M2 | HEART RATE: 65 BPM | HEIGHT: 59 IN | OXYGEN SATURATION: 98 % | RESPIRATION RATE: 16 BRPM | DIASTOLIC BLOOD PRESSURE: 50 MMHG | WEIGHT: 110 LBS | TEMPERATURE: 97.4 F | SYSTOLIC BLOOD PRESSURE: 110 MMHG

## 2017-04-18 DIAGNOSIS — R35.0 FREQUENCY OF URINATION: ICD-10-CM

## 2017-04-18 DIAGNOSIS — N30.00 ACUTE CYSTITIS WITHOUT HEMATURIA: Primary | ICD-10-CM

## 2017-04-18 LAB
BILIRUB UR QL STRIP: NEGATIVE
GLUCOSE UR-MCNC: NEGATIVE MG/DL
KETONES P FAST UR STRIP-MCNC: NEGATIVE MG/DL
PH UR STRIP: 5.5 [PH] (ref 4.6–8)
PROT UR QL STRIP: NORMAL MG/DL
SP GR UR STRIP: 1.02 (ref 1–1.03)
UA UROBILINOGEN AMB POC: NORMAL (ref 0.2–1)
URINALYSIS CLARITY POC: CLEAR
URINALYSIS COLOR POC: YELLOW
URINE BLOOD POC: NEGATIVE
URINE LEUKOCYTES POC: NEGATIVE
URINE NITRITES POC: NEGATIVE

## 2017-04-18 NOTE — MR AVS SNAPSHOT
Visit Information Date & Time Provider Department Dept. Phone Encounter #  
 4/18/2017  2:45 PM Patsy Coughlin MD [address] Internal Medicine [phone] [id number] Follow-up Instructions Return if symptoms worsen or fail to improve. Your Appointments 4/18/2017  2:45 PM  
ACUTE CARE with Patsy Coughlin MD  
[address] Internal Medicine Lennie June) Appt Note: uti  
 [address] [facility] [address]  
[address] [facility]  
  
    
 6/7/2017 11:30 AM  
ROUTINE CARE with Patsy Coughlin MD  
[address] Internal Medicine Lennie June) Appt Note: f/u 3m  
 [address] [facility] [address]  
[address] [address] [place] [address] Upcoming Health Maintenance Date Due  
 GLAUCOMA SCREENING Q2Y 11/22/1992 MEDICARE YEARLY EXAM 3/8/2018 DTaP/Tdap/Td series (2 - Td) 2/3/2024 Allergies as of 4/18/2017  Review Complete On: 4/18/2017 By: Patsy Coughlin MD  
  
 Severity Noted Reaction Type Reactions Ciprofibrate  08/18/2015    Nausea Only Codeine  03/12/2013    Nausea Only Erythromycin  03/25/2013    Nausea Only Tetracycline  03/25/2013    Nausea Only Tramadol  03/12/2013    Other (comments) \"drunk\" Current Immunizations  Reviewed on 3/25/2013 Name Date Influenza Vaccine 9/25/2012 Pneumococcal Polysaccharide (PPSV-23) 3/27/2013  4:46 PM  
  
 Not reviewed this visit You Were Diagnosed With   
  
 Codes Comments Acute cystitis without hematuria    -  Primary ICD-10-CM: N30.00 ICD-9-CM: 595.0 Frequency of urination     ICD-10-CM: R35.0 ICD-9-CM: 788.41 Vitals Height(growth percentile) Weight(growth percentile) BMI OB Status Smoking Status 4' 11\" (1.499 m) 110 lb (49.9 kg) 22.22 kg/m2 Hysterectomy Never Smoker BMI and BSA Data Body Mass Index Body Surface Area  
 22.22 kg/m 2 1.44 m 2 Preferred Pharmacy Pharmacy Name Phone [facility], [address] [phone] Your Updated Medication List  
  
   
This list is accurate as of: 4/18/17 11:45 AM.  Always use your most recent med list.  
  
  
  
  
 acetaminophen 650 mg Tab Take 650 mg by mouth every six (6) hours. amitriptyline 10 mg tablet Commonly known as:  ELAVIL TAKE 1 TABLET TWICE A DAY  
  
 aspirin delayed-release 81 mg tablet Take 81 mg by mouth daily. AZOPT 1 % ophthalmic suspension Generic drug:  brinzolamide Administer 1 Drop to left eye two (2) times a day. CENTRUM SILVER Tab tablet Generic drug:  multivitamins-minerals-lutein Take 1 Tab by mouth daily. HYDROcodone-acetaminophen 5-325 mg per tablet Commonly known as:  Ceil Heap Take 1 Tab by mouth every six (6) hours as needed for Pain. Max Daily Amount: 4 Tabs. ipratropium 0.06 % nasal spray Commonly known as:  ATROVENT  
2 Sprays by Both Nostrils route two (2) times a day. levothyroxine 50 mcg tablet Commonly known as:  SYNTHROID  
TAKE ONE-HALF (1/2) TABLET DAILY BEFORE BREAKFAST LORazepam 1 mg tablet Commonly known as:  ATIVAN  
1 tab by mouth every twelve hrs as needed for anxiety  
  
 raNITIdine 150 mg tablet Commonly known as:  ZANTAC Take 150 mg by mouth daily. senna-docusate 8.6-50 mg per tablet Commonly known as:  Radha Lim Take 1 Tab by mouth two (2) times a day. simvastatin 10 mg tablet Commonly known as:  ZOCOR Take 10 mg by mouth nightly. We Performed the Following AMB POC URINALYSIS DIP STICK AUTO W/O MICRO [55065 CPT(R)] Follow-up Instructions Return if symptoms worsen or fail to improve. Patient Instructions It was a pleasure to see you! As discussed: 
 
 
  
Frequent Urination: Care Instructions Your Care Instructions An urge to urinate frequently but usually passing only small amounts of urine is a common symptom of urinary problems, such as urinary tract infections. The bladder may become inflamed. This can cause the urge to urinate. You may try to urinate more often than usual to try to soothe that urge. Frequent urination also may be caused by sexually transmitted infections (STIs) or kidney stones. Or it may happen when something irritates the tube that carries urine from the bladder to the outside of the body (urethra). It may also be a sign of diabetes. The cause may be hard to find. You may need tests. Follow-up care is a key part of your treatment and safety. Be sure to make and go to all appointments, and call your doctor if you are having problems. It's also a good idea to know your test results and keep a list of the medicines you take. How can you care for yourself at home? · Drink extra water for the next day or two. This will help make the urine less concentrated. (If you have kidney, heart, or liver disease and have to limit fluids, talk with your doctor before you increase the amount of fluids you drink.) · Avoid drinks that are carbonated or have caffeine. They can irritate the bladder. For women: · Urinate right after you have sex. · After you go to the bathroom, wipe from front to back. · Avoid douches, bubble baths, and feminine hygiene sprays. And avoid other feminine hygiene products that have deodorants. When should you call for help? Call your doctor now or seek immediate medical care if: 
· You have new symptoms, such as fever, nausea, or vomiting. · You have new or worse symptoms of a urinary problem. For example: ¨ You have blood or pus in your urine. ¨ You have chills or body aches. ¨ It hurts to urinate. ¨ You have groin or belly pain. ¨ You have pain in your back just below your rib cage (the flank area). Watch closely for changes in your health, and be sure to contact your doctor if you feel thirstier than usual. 
Where can you learn more? Go to http://nikolay-kendra.info/. Enter 486 9511 in the search box to learn more about \"Frequent Urination: Care Instructions. \" Current as of: November 28, 2016 Content Version: 11.2 © 7014-0084 RumbleTalk. Care instructions adapted under license by [employer] Technology (which disclaims liability or warranty for this information). If you have questions about a medical condition or this instruction, always ask your healthcare professional. [place] [address] any warranty or liability for your use of this information. Introducing hospitals & HEALTH SERVICES! [employer] introduces Polleverywhere patient portal. Now you can access parts of your medical record, email your doctor's office, and request medication refills online. 1. In your internet browser, go to [email or web address] 2. Click on the First Time User? Click Here link in the Sign In box. You will see the New Member Sign Up page. 3. Enter your Polleverywhere Access Code exactly as it appears below. You will not need to use this code after youve completed the sign-up process. If you do not sign up before the expiration date, you must request a new code. · Polleverywhere Access Code: [email or web address] Expires: 6/5/2017 10:41 AM 
 
4. Enter the last four digits of your Social Security Number (xxxx) and Date of Birth (mm/dd/yyyy) as indicated and click Submit. You will be taken to the next sign-up page. 5. Create a [facility] ID. This will be your Polleverywhere login ID and cannot be changed, so think of one that is secure and easy to remember. 6. Create a Polleverywhere password. You can change your password at any time. 7. Enter your Password Reset Question and Answer. This can be used at a later time if you forget your password. 8. Enter your e-mail address. You will receive e-mail notification when new information is available in [address] 9. Click Sign Up. You can now view and download portions of your medical record. 10. Click the Download Summary menu link to download a portable copy of your medical information. If you have questions, please visit the Frequently Asked Questions section of the [employer] website. Remember, [employer] is NOT to be used for urgent needs. For medical emergencies, dial 911. Now available from your iPhone and Android! Please provide this summary of care documentation to your next provider. Your primary care clinician is listed as Destiny Client. If you have any questions after today's visit, please call [phone].

## 2017-04-18 NOTE — PROGRESS NOTES
Chief Complaint   Patient presents with    Bladder Infection     1. Have you been to the ER, urgent care clinic since your last visit? Hospitalized since your last visit? No    2. Have you seen or consulted any other health care providers outside of the [address] [place] [address] since your last visit? Include any pap smears or colon screening.  No     Pt stated was told to come in for urine test.

## 2017-04-18 NOTE — PATIENT INSTRUCTIONS
It was a pleasure to see you! As discussed:         Frequent Urination: Care Instructions  Your Care Instructions  An urge to urinate frequently but usually passing only small amounts of urine is a common symptom of urinary problems, such as urinary tract infections. The bladder may become inflamed. This can cause the urge to urinate. You may try to urinate more often than usual to try to soothe that urge. Frequent urination also may be caused by sexually transmitted infections (STIs) or kidney stones. Or it may happen when something irritates the tube that carries urine from the bladder to the outside of the body (urethra). It may also be a sign of diabetes. The cause may be hard to find. You may need tests. Follow-up care is a key part of your treatment and safety. Be sure to make and go to all appointments, and call your doctor if you are having problems. It's also a good idea to know your test results and keep a list of the medicines you take. How can you care for yourself at home? · Drink extra water for the next day or two. This will help make the urine less concentrated. (If you have kidney, heart, or liver disease and have to limit fluids, talk with your doctor before you increase the amount of fluids you drink.)  · Avoid drinks that are carbonated or have caffeine. They can irritate the bladder. For women:  · Urinate right after you have sex. · After you go to the bathroom, wipe from front to back. · Avoid douches, bubble baths, and feminine hygiene sprays. And avoid other feminine hygiene products that have deodorants. When should you call for help? Call your doctor now or seek immediate medical care if:  · You have new symptoms, such as fever, nausea, or vomiting. · You have new or worse symptoms of a urinary problem. For example:  ¨ You have blood or pus in your urine. ¨ You have chills or body aches. ¨ It hurts to urinate. ¨ You have groin or belly pain.   ¨ You have pain in your back just below your rib cage (the flank area). Watch closely for changes in your health, and be sure to contact your doctor if you feel thirstier than usual.  Where can you learn more? Go to http://nikolay-kendra.info/. Enter 282 5421 in the search box to learn more about \"Frequent Urination: Care Instructions. \"  Current as of: November 28, 2016  Content Version: 11.2  © 6820-8471 AcesoBee. Care instructions adapted under license by GLSS (which disclaims liability or warranty for this information). If you have questions about a medical condition or this instruction, always ask your healthcare professional. [address] any warranty or liability for your use of this information.

## 2017-04-18 NOTE — PROGRESS NOTES
HISTORY OF PRESENT ILLNESS  Jose Alejandro Fontaine is a 80 y.o. female. HPI  UTI Follow-up   She reports improved symptoms. She completed 5 days of bactrim. She had difficulty swallowing it. She increased her fluid intake- cranberry juice and water. Denies any dysuria, frequency, n/v.     ROSper HPI     Patient Active Problem List    Diagnosis Date Noted    Paroxysmal a-fib ([address] 09/17/2014    Glaucoma 09/17/2014    Hypercholesterolemia 09/17/2014    Anxiety 09/17/2014    Gas 09/17/2014    DJD (degenerative joint disease) of knee 03/26/2013       Current Outpatient Prescriptions   Medication Sig Dispense Refill    LORazepam (ATIVAN) 1 mg tablet 1 tab by mouth every twelve hrs as needed for anxiety 180 Tab 1    levothyroxine (SYNTHROID) 50 mcg tablet TAKE ONE-HALF (1/2) TABLET DAILY BEFORE BREAKFAST 45 Tab 1    amitriptyline (ELAVIL) 10 mg tablet TAKE 1 TABLET TWICE A  Tab 3    HYDROcodone-acetaminophen (NORCO) 5-325 mg per tablet Take 1 Tab by mouth every six (6) hours as needed for Pain. Max Daily Amount: 4 Tabs. 15 Tab 0    aspirin delayed-release 81 mg tablet Take 81 mg by mouth daily.  ranitidine (ZANTAC) 150 mg tablet Take 150 mg by mouth daily.  acetaminophen 650 mg Tab Take 650 mg by mouth every six (6) hours. (Patient taking differently: Take 650 mg by mouth every six (6) hours as needed.) 1 Tab 0    senna-docusate (PERICOLACE) 8.6-50 mg per tablet Take 1 Tab by mouth two (2) times a day. (Patient taking differently: Take 1 Tab by mouth as needed.) 60 Tab 0    ipratropium (ATROVENT) 0.06 % nasal spray 2 Sprays by Both Nostrils route two (2) times a day.  brinzolamide (AZOPT) 1 % ophthalmic suspension Administer 1 Drop to left eye two (2) times a day.  simvastatin (ZOCOR) 10 mg tablet Take 10 mg by mouth nightly.  multivitamins-minerals-lutein (CENTRUM SILVER) Tab Take 1 Tab by mouth daily.          Allergies   Allergen Reactions    Ciprofibrate Nausea Only    Codeine Nausea Only    Erythromycin Nausea Only    Tetracycline Nausea Only    Tramadol Other (comments)     \"drunk\"      Visit Vitals    Ht 4' 11\" (1.499 m)    Wt 110 lb (49.9 kg)    BMI 22.22 kg/m2       Physical Exam   Constitutional: She is oriented to person, place, and time. No distress. Cardiovascular: Normal rate and regular rhythm. Pulmonary/Chest: Breath sounds normal. No respiratory distress. She has no wheezes. She has no rales. Neurological: She is alert and oriented to person, place, and time. Psychiatric: She has a normal mood and affect. Recent Results (from the past 12 hour(s))   AMB POC URINALYSIS DIP STICK AUTO W/O MICRO    Collection Time: 04/18/17 11:44 AM   Result Value Ref Range    Color (UA POC) Yellow     Clarity (UA POC) Clear     Glucose (UA POC) Negative Negative    Bilirubin (UA POC) Negative Negative    Ketones (UA POC) Negative Negative    Specific gravity (UA POC) 1.020 1.001 - 1.035    Blood (UA POC) Negative Negative    pH (UA POC) 5.5 4.6 - 8.0    Protein (UA POC) Trace Negative mg/dL    Urobilinogen (UA POC) 0.2 mg/dL 0.2 - 1    Nitrites (UA POC) Negative Negative    Leukocyte esterase (UA POC) Negative Negative       ASSESSMENT and PLAN  Catherine Graham was seen today for bladder infection follow-up. UA negative and sx have resolved. Continue prevention measures. Red flags to warrant ER or earlier clinical evaluation reviewed. See AVS for full details of plan and patient discussion. Diagnoses and all orders for this visit:   Acute cystitis without hematuria    Frequency of urination  -     AMB POC URINALYSIS DIP STICK AUTO W/O MICRO      Follow-up Disposition:  Return if symptoms worsen or fail to improve. Medication risks/benefits/costs/interactions/alternatives discussed with patient. Zachary Steele  was given an after visit summary which includes diagnoses, current medications, & vitals.   she expressed understanding with the diagnosis and plan.

## 2017-06-07 ENCOUNTER — OFFICE VISIT (OUTPATIENT)
Dept: INTERNAL MEDICINE CLINIC | Age: 82
End: 2017-06-07

## 2017-06-07 ENCOUNTER — HOSPITAL ENCOUNTER (OUTPATIENT)
Dept: LAB | Age: 82
Discharge: HOME | End: 2017-06-07
Payer: MEDICARE

## 2017-06-07 VITALS
DIASTOLIC BLOOD PRESSURE: 60 MMHG | RESPIRATION RATE: 16 BRPM | BODY MASS INDEX: 22.09 KG/M2 | SYSTOLIC BLOOD PRESSURE: 140 MMHG | TEMPERATURE: 97.9 F | HEART RATE: 85 BPM | HEIGHT: 59 IN | OXYGEN SATURATION: 96 % | WEIGHT: 109.6 LBS

## 2017-06-07 DIAGNOSIS — E03.9 ACQUIRED HYPOTHYROIDISM: ICD-10-CM

## 2017-06-07 DIAGNOSIS — E78.00 HYPERCHOLESTEROLEMIA: Primary | ICD-10-CM

## 2017-06-07 DIAGNOSIS — D64.9 ANEMIA, UNSPECIFIED TYPE: ICD-10-CM

## 2017-06-07 DIAGNOSIS — N39.0 RECURRENT UTI: ICD-10-CM

## 2017-06-07 PROCEDURE — 81001 URINALYSIS AUTO W/SCOPE: CPT

## 2017-06-07 PROCEDURE — 87086 URINE CULTURE/COLONY COUNT: CPT

## 2017-06-07 PROCEDURE — 87088 URINE BACTERIA CULTURE: CPT

## 2017-06-07 PROCEDURE — 87077 CULTURE AEROBIC IDENTIFY: CPT

## 2017-06-07 PROCEDURE — 87186 SC STD MICRODIL/AGAR DIL: CPT

## 2017-06-07 RX ORDER — AMOXICILLIN AND CLAVULANATE POTASSIUM 875; 125 MG/1; MG/1
1 TABLET, FILM COATED ORAL EVERY 12 HOURS
Qty: 6 TAB | Refills: 0 | Status: SHIPPED | OUTPATIENT
Start: 2017-06-07 | End: 2017-06-08 | Stop reason: SDUPTHER

## 2017-06-07 NOTE — PROGRESS NOTES
Chief Complaint   Patient presents with    Breast Problem     1. Have you been to the ER, urgent care clinic since your last visit? Hospitalized since your last visit? No    2. Have you seen or consulted any other health care providers outside of the [address] [place] [address] since your last visit? Include any pap smears or colon screening.  Yes When: 2 weeks ago Where: [facility]ist Reason for visit: 4 month check up

## 2017-06-07 NOTE — MR AVS SNAPSHOT
Visit Information Date & Time Provider Department Dept. Phone Encounter #  
 6/7/2017 11:30 AM Estrella Gillis MD [facility] Internal Medicine [phone] [id number] Follow-up Instructions Return in about 4 months (around 10/7/2017). Upcoming Health Maintenance Date Due INFLUENZA AGE 9 TO ADULT 8/1/2017 MEDICARE YEARLY EXAM 3/8/2018 GLAUCOMA SCREENING Q2Y 4/18/2019 DTaP/Tdap/Td series (2 - Td) 2/3/2024 Allergies as of 6/7/2017  Review Complete On: 6/7/2017 By: Ruth Escalante LPN Severity Noted Reaction Type Reactions Ciprofibrate  08/18/2015    Nausea Only Codeine  03/12/2013    Nausea Only Erythromycin  03/25/2013    Nausea Only Tetracycline  03/25/2013    Nausea Only Tramadol  03/12/2013    Other (comments) \"drunk\" Current Immunizations  Reviewed on 3/25/2013 Name Date Influenza Vaccine 9/25/2012 Pneumococcal Polysaccharide (PPSV-23) 3/27/2013  4:46 PM  
  
 Not reviewed this visit You Were Diagnosed With   
  
 Codes Comments Hypercholesterolemia    -  Primary ICD-10-CM: E78.00 ICD-9-CM: 272.0 Acquired hypothyroidism     ICD-10-CM: E03.9 ICD-9-CM: 244.9 Recurrent UTI     ICD-10-CM: N39.0 ICD-9-CM: 599.0 Anemia, unspecified type     ICD-10-CM: D64.9 ICD-9-CM: 742. 9 Vitals BP Pulse Temp Resp Height(growth percentile) Weight(growth percentile) 140/60 (BP 1 Location: Right arm, BP Patient Position: Sitting) 85 97.9 °F (36.6 °C) (Oral) 16 4' 11\" (1.499 m) 109 lb 9.6 oz (49.7 kg) SpO2 BMI OB Status Smoking Status 96% 22.14 kg/m2 Hysterectomy Never Smoker Vitals History BMI and BSA Data Body Mass Index Body Surface Area  
 22.14 kg/m 2 1.44 m 2 Preferred Pharmacy Pharmacy Name Phone [address] [facility] [phone] Your Updated Medication List  
  
   
 This list is accurate as of: 6/7/17 12:08 PM.  Always use your most recent med list.  
  
  
  
  
 acetaminophen 650 mg Tab Take 650 mg by mouth every six (6) hours. amitriptyline 10 mg tablet Commonly known as:  ELAVIL TAKE 1 TABLET TWICE A DAY  
  
 amoxicillin-clavulanate 875-125 mg per tablet Commonly known as:  AUGMENTIN Take 1 Tab by mouth every twelve (12) hours for 3 days. aspirin delayed-release 81 mg tablet Take 81 mg by mouth daily. AZOPT 1 % ophthalmic suspension Generic drug:  brinzolamide Administer 1 Drop to left eye two (2) times a day. CENTRUM SILVER Tab tablet Generic drug:  multivitamins-minerals-lutein Take 1 Tab by mouth daily. HYDROcodone-acetaminophen 5-325 mg per tablet Commonly known as:  Kriste Bucky Take 1 Tab by mouth every six (6) hours as needed for Pain. Max Daily Amount: 4 Tabs. ipratropium 0.06 % nasal spray Commonly known as:  ATROVENT  
2 Sprays by Both Nostrils route two (2) times a day. levothyroxine 50 mcg tablet Commonly known as:  SYNTHROID  
TAKE ONE-HALF (1/2) TABLET DAILY BEFORE BREAKFAST LORazepam 1 mg tablet Commonly known as:  ATIVAN  
1 tab by mouth every twelve hrs as needed for anxiety  
  
 raNITIdine 150 mg tablet Commonly known as:  ZANTAC Take 150 mg by mouth daily. senna-docusate 8.6-50 mg per tablet Commonly known as:  Maria A Caban Take 1 Tab by mouth two (2) times a day. simvastatin 10 mg tablet Commonly known as:  ZOCOR Take 10 mg by mouth nightly. Prescriptions Sent to Pharmacy Refills  
 amoxicillin-clavulanate (AUGMENTIN) 875-125 mg per tablet 0 Sig: Take 1 Tab by mouth every twelve (12) hours for 3 days. Class: Normal  
 Pharmacy: [address], [address] [place] [address] Ph #: [phone] Route: Oral  
  
We Performed the Following AMB POC URINALYSIS DIP STICK AUTO W/O MICRO [47526 CPT(R)] CBC WITH AUTOMATED DIFF [42726 CPT(R)] CULTURE, URINE [id number] CPT(R)] FERRITIN [42485 CPT(R)] IRON PROFILE [id number] CPT(R)] METABOLIC PANEL, COMPREHENSIVE [20427 CPT(R)] REFERRAL TO UROLOGY [PND156 Custom] Comments:  
 Please evaluate patient for recurrent UTI  
 UA/M W/RFLX CULTURE, COMP [41761 CPT(R)] URINALYSIS W/MICROSCOPIC [94890 CPT(R)] Follow-up Instructions Return in about 4 months (around 10/7/2017). Referral Information Referral ID Referred By Referred To  
  
 [id number] [facility], [facility], [address] [place], [address] Phone: [phone] Fax: [phone] Visits Status Start Date End Date 1 New Request 6/7/17 6/7/18 If your referral has a status of pending review or denied, additional information will be sent to support the outcome of this decision. Patient Instructions It was a pleasure to see you! As discussed: 
 
I have ordered your age appropriate labs please complete them. You have a  UTI. Take the antibiotics as prescribed. I've sent  A culture of the urine to find out what bacteria is causing your symptoms and if the current antibiotics will be effective. Complete urine test in lab after treatment is done If we need to change your medication,our office will call you. Urinary Tract Infection in Women: Care Instructions Your Care Instructions A urinary tract infection, or UTI, is a general term for an infection anywhere between the kidneys and the urethra (where urine comes out). Most UTIs are bladder infections. They often cause pain or burning when you urinate. UTIs are caused by bacteria and can be cured with antibiotics. Be sure to complete your treatment so that the infection goes away. Follow-up care is a key part of your treatment and safety.  Be sure to make and go to all appointments, and call your doctor if you are having problems. It's also a good idea to know your test results and keep a list of the medicines you take. How can you care for yourself at home? · Take your antibiotics as directed. Do not stop taking them just because you feel better. You need to take the full course of antibiotics. · Drink extra water and other fluids for the next day or two. This may help wash out the bacteria that are causing the infection. (If you have kidney, heart, or liver disease and have to limit fluids, talk with your doctor before you increase your fluid intake.) · Avoid drinks that are carbonated or have caffeine. They can irritate the bladder. · Urinate often. Try to empty your bladder each time. · To relieve pain, take a hot bath or lay a heating pad set on low over your lower belly or genital area. Never go to sleep with a heating pad in place. To prevent UTIs · Drink plenty of water each day. This helps you urinate often, which clears bacteria from your system. (If you have kidney, heart, or liver disease and have to limit fluids, talk with your doctor before you increase your fluid intake.) · Urinate when you need to. · Urinate right after you have sex. · Change sanitary pads often. · Avoid douches, bubble baths, feminine hygiene sprays, and other feminine hygiene products that have deodorants. · After going to the bathroom, wipe from front to back. When should you call for help? Call your doctor now or seek immediate medical care if: · Symptoms such as fever, chills, nausea, or vomiting get worse or appear for the first time. · You have new pain in your back just below your rib cage. This is called flank pain. · There is new blood or pus in your urine. · You have any problems with your antibiotic medicine. Watch closely for changes in your health, and be sure to contact your doctor if: 
· You are not getting better after taking an antibiotic for 2 days. · Your symptoms go away but then come back. Where can you learn more? Go to http://nikolay-kendra.info/. Enter T146 in the search box to learn more about \"Urinary Tract Infection in Women: Care Instructions. \" Current as of: November 28, 2016 Content Version: 11.2 © 5200-0249 [employer]. Care instructions adapted under license by [employer] (which disclaims liability or warranty for this information). If you have questions about a medical condition or this instruction, always ask your healthcare professional. [address] any warranty or liability for your use of this information. Preventing Falls: Care Instructions Your Care Instructions Getting around your home safely can be a challenge if you have injuries or health problems that make it easy for you to fall. Loose rugs and furniture in walkways are among the dangers for many older people who have problems walking or who have poor eyesight. People who have conditions such as arthritis, osteoporosis, or dementia also have to be careful not to fall. You can make your home safer with a few simple measures. Follow-up care is a key part of your treatment and safety. Be sure to make and go to all appointments, and call your doctor if you are having problems. It's also a good idea to know your test results and keep a list of the medicines you take. How can you care for yourself at home? Taking care of yourself · You may get dizzy if you do not drink enough water. To prevent dehydration, drink plenty of fluids, enough so that your urine is light yellow or clear like water. Choose water and other caffeine-free clear liquids. If you have kidney, heart, or liver disease and have to limit fluids, talk with your doctor before you increase the amount of fluids you drink. · Exercise regularly to improve your strength, muscle tone, and balance. Walk if you can. Swimming may be a good choice if you cannot walk easily. · Have your vision and hearing checked each year or any time you notice a change. If you have trouble seeing and hearing, you might not be able to avoid objects and could lose your balance. · Know the side effects of the medicines you take. Ask your doctor or pharmacist whether the medicines you take can affect your balance. Sleeping pills or sedatives can affect your balance. · Limit the amount of alcohol you drink. Alcohol can impair your balance and other senses. · Ask your doctor whether calluses or corns on your feet need to be removed. If you wear loose-fitting shoes because of calluses or corns, you can lose your balance and fall. · Talk to your doctor if you have numbness in your feet. Preventing falls at home · Remove raised doorway thresholds, throw rugs, and clutter. Repair loose carpet or raised areas in the floor. · Move furniture and electrical cords to keep them out of walking paths. · Use nonskid floor wax, and wipe up spills right away, especially on ceramic tile floors. · If you use a walker or cane, put rubber tips on it. If you use crutches, clean the bottoms of them regularly with an abrasive pad, such as steel wool. · Keep your house well lit, especially [facility], and outside walkways. Use night-lights in areas such as hallways and bathrooms. Add extra light switches or use remote switches (such as switches that go on or off when you clap your hands) to make it easier to turn lights on if you have to get up during the night. · Install sturdy handrails on stairways. · Move items in your cabinets so that the things you use a lot are on the lower shelves (about waist level). · Keep a cordless phone and a flashlight with new batteries by your bed. If possible, put a phone in each of the main rooms of your house, or carry a cell phone in case you fall and cannot reach a phone. Or, you can wear a device around your neck or wrist. You push a button that sends a signal for help. · Wear low-heeled shoes that fit well and give your feet good support. Use footwear with nonskid soles. Check the heels and soles of your shoes for wear. Repair or replace worn heels or soles. · Do not wear socks without shoes on wood floors. · Walk on the grass when the sidewalks are slippery. If you live in an area that gets snow and ice in the winter, sprinkle salt on slippery steps and sidewalks. Preventing falls in the bath · Install grab bars and nonskid mats inside and outside your shower or tub and near the toilet and sinks. · Use shower chairs and bath benches. · Use a hand-held shower head that will allow you to sit while showering. · Get into a tub or shower by putting the weaker leg in first. Get out of a tub or shower with your strong side first. 
· Repair loose toilet seats and consider installing a raised toilet seat to make getting on and off the toilet easier. · Keep your bathroom door unlocked while you are in the shower. Where can you learn more? Go to http://nikolay-kendra.info/. Enter 0476 [id number] in the search box to learn more about \"Preventing Falls: Care Instructions. \" Current as of: August 4, 2016 Content Version: 11.2 © 3351-1080 [employer]. Care instructions adapted under license by [employer] (which disclaims liability or warranty for this information). If you have questions about a medical condition or this instruction, always ask your healthcare professional. [place] [address] any warranty or liability for your use of this information. Introducing hospitals & HEALTH SERVICES! Jigna Irvin introduces [employer] patient portal. Now you can access parts of your medical record, email your doctor's office, and request medication refills online. 1. In your internet browser, go to [email or web address] 2. Click on the First Time User? Click Here link in the Sign In box. You will see the New Member Sign Up page. 3. Enter your [employer] Access Code exactly as it appears below. You will not need to use this code after youve completed the sign-up process. If you do not sign up before the expiration date, you must request a new code. · [employer] Access Code: [email or web address] Expires: 9/5/2017 11:03 AM 
 
4. Enter the last four digits of your Social Security Number (xxxx) and Date of Birth (mm/dd/yyyy) as indicated and click Submit. You will be taken to the next sign-up page. 5. Create a [employer] ID. This will be your [employer] login ID and cannot be changed, so think of one that is secure and easy to remember. 6. Create a [employer] password. You can change your password at any time. 7. Enter your Password Reset Question and Answer. This can be used at a later time if you forget your password. 8. Enter your e-mail address. You will receive e-mail notification when new information is available in [address] 9. Click Sign Up. You can now view and download portions of your medical record. 10. Click the Download Summary menu link to download a portable copy of your medical information. If you have questions, please visit the Frequently Asked Questions section of the [employer] website. Remember, [employer] is NOT to be used for urgent needs. For medical emergencies, dial 911. Now available from your iPhone and Android! Please provide this summary of care documentation to your next provider. Your primary care clinician is listed as [facility]. If you have any questions after today's visit, please call [phone].

## 2017-06-07 NOTE — PROGRESS NOTES
HISTORY OF PRESENT ILLNESS  Chichi Roberts is a 80 y.o. female. HPI    Hypothyroidism  Patient is seen for followup of hypothyroidism. Thyroid ROS: denies fatigue, weight changes, heat/cold intolerance, bowel/skin changes or CVS symptoms. Anxiety  Patient is seen for followup of anxiety. Treatment includes Ativan and no other therapies. Had water damage in her apartment unit.   she denies suicidal thoughts with specific plan. she experiences the following side effects from the treatment: none. PHQ over the last two weeks 6/7/2017   Little interest or pleasure in doing things Not at all   Feeling down, depressed or hopeless Not at all   Total Score PHQ 2 0     Recurrent UTI  Chichi Roberts requests UA today  Denies UTI symptoms- dysuria, fever, chills. Review of Systems   Constitutional: Negative for diaphoresis, fever and weight loss. Eyes: Negative for blurred vision and pain. Respiratory: Negative for shortness of breath. Cardiovascular: Negative for chest pain, orthopnea and leg swelling. Genitourinary: Negative for dysuria, frequency and urgency. Neurological: Negative for focal weakness and headaches. Psychiatric/Behavioral: Negative for depression. The patient does not have insomnia. Patient Active Problem List    Diagnosis Date Noted    Paroxysmal a-fib ([facility] [address] 09/17/2014    Glaucoma 09/17/2014    Hypercholesterolemia 09/17/2014    Anxiety 09/17/2014    Gas 09/17/2014    DJD (degenerative joint disease) of knee 03/26/2013       Current Outpatient Prescriptions   Medication Sig Dispense Refill    levothyroxine (SYNTHROID) 50 mcg tablet TAKE ONE-HALF (1/2) TABLET DAILY BEFORE BREAKFAST 45 Tab 4    LORazepam (ATIVAN) 1 mg tablet 1 tab by mouth every twelve hrs as needed for anxiety 180 Tab 1    amitriptyline (ELAVIL) 10 mg tablet TAKE 1 TABLET TWICE A  Tab 3    aspirin delayed-release 81 mg tablet Take 81 mg by mouth daily.       ranitidine (ZANTAC) 150 mg tablet Take 150 mg by mouth daily.  acetaminophen 650 mg Tab Take 650 mg by mouth every six (6) hours. (Patient taking differently: Take 650 mg by mouth every six (6) hours as needed.) 1 Tab 0    senna-docusate (PERICOLACE) 8.6-50 mg per tablet Take 1 Tab by mouth two (2) times a day. (Patient taking differently: Take 1 Tab by mouth as needed.) 60 Tab 0    ipratropium (ATROVENT) 0.06 % nasal spray 2 Sprays by Both Nostrils route two (2) times a day.  brinzolamide (AZOPT) 1 % ophthalmic suspension Administer 1 Drop to left eye two (2) times a day.  simvastatin (ZOCOR) 10 mg tablet Take 10 mg by mouth nightly.  multivitamins-minerals-lutein (CENTRUM SILVER) Tab Take 1 Tab by mouth daily.  HYDROcodone-acetaminophen (NORCO) 5-325 mg per tablet Take 1 Tab by mouth every six (6) hours as needed for Pain. Max Daily Amount: 4 Tabs. 15 Tab 0       Allergies   Allergen Reactions    Ciprofibrate Nausea Only    Codeine Nausea Only    Erythromycin Nausea Only    Tetracycline Nausea Only    Tramadol Other (comments)     \"drunk\"      Visit Vitals    /60 (BP 1 Location: Right arm, BP Patient Position: Sitting)    Pulse 85    Temp 97.9 °F (36.6 °C) (Oral)    Resp 16    Ht 4' 11\" (1.499 m)    Wt 109 lb 9.6 oz (49.7 kg)    SpO2 96%    BMI 22.14 kg/m2      Physical Exam   Constitutional: She is oriented to person, place, and time. No distress. Cardiovascular: Normal rate and regular rhythm. Pulmonary/Chest: Breath sounds normal. No respiratory distress. She has no wheezes. She has no rales. Neurological: She is alert and oriented to person, place, and time. Psychiatric: She has a normal mood and affect.      Lab Results  Component Value Date/Time   WBC 3.8 02/21/2017 10:36 AM   HGB 10.5 02/21/2017 10:36 AM   HCT 33.5 02/21/2017 10:36 AM   PLATELET 176 83/18/6714 10:36 AM   MCV 83 02/21/2017 10:36 AM       Lab Results  Component Value Date/Time   Hemoglobin A1c 5.3 03/12/2013 08:39 AM   Glucose 81 02/21/2017 10:36 AM   Glucose (POC) 79 03/25/2013 08:40 AM   LDL, calculated 90 02/21/2017 10:36 AM   Creatinine 0.87 02/21/2017 10:36 AM      Lab Results  Component Value Date/Time   Cholesterol, total 163 02/21/2017 10:36 AM   HDL Cholesterol 55 02/21/2017 10:36 AM   LDL, calculated 90 02/21/2017 10:36 AM   Triglyceride 90 02/21/2017 10:36 AM       Lab Results  Component Value Date/Time   ALT (SGPT) 13 02/21/2017 10:36 AM   AST (SGOT) 22 02/21/2017 10:36 AM   Alk. phosphatase 55 02/21/2017 10:36 AM   Bilirubin, total 0.3 02/21/2017 10:36 AM       Lab Results  Component Value Date/Time   GFR est AA 68 02/21/2017 10:36 AM   GFR est non-AA 59 02/21/2017 10:36 AM   Creatinine 0.87 02/21/2017 10:36 AM   BUN 19 02/21/2017 10:36 AM   Sodium 139 02/21/2017 10:36 AM   Potassium 4.6 02/21/2017 10:36 AM   Chloride 100 02/21/2017 10:36 AM   CO2 23 02/21/2017 10:36 AM      Lab Results  Component Value Date/Time   TSH 1.890 02/21/2017 10:36 AM   T4, Total 7.0 08/27/2015 09:20 AM      Lab Results   Component Value Date/Time    Glucose 81 02/21/2017 10:36 AM    Glucose (POC) 79 03/25/2013 08:40 AM         ASSESSMENT and Suzi Bryan was seen today for breast problem. Diagnoses and all orders for this visit:    Hypercholesterolemia- recent LDL at goal. Continue Zocor as Kendra Sarabia is very active and vibrant     Acquired hypothyroidism- recent TSH at goal    Recurrent UTI- Asx but given last UA was negative. Will treat for 3 days with Augmentin. Schedule with Urology. Clearance UA when abx completed. UA/M & Cx sent   Red flags to warrant ER or earlier clinical evaluation reviewed. Anemia, unspecified type- .Asx. Check Hgb/ Hct stable. Red flags to warrant ER or earlier clinical evaluation reviewed. -     CBC WITH AUTOMATED DIFF  -     METABOLIC PANEL, COMPREHENSIVE  -     IRON PROFILE  -     FERRITIN    Follow-up Disposition:  Return in about 4 months (around 10/7/2017).     Medication risks/benefits/costs/interactions/alternatives discussed with patient. Kam Lalazara  was given an after visit summary which includes diagnoses, current medications, & vitals. she expressed understanding with the diagnosis and plan.

## 2017-06-07 NOTE — PATIENT INSTRUCTIONS
It was a pleasure to see you! As discussed:    I have ordered your age appropriate labs please complete them. You have a  UTI. Take the antibiotics as prescribed. I've sent  A culture of the urine to find out what bacteria is causing your symptoms and if the current antibiotics will be effective. Complete urine test in lab after treatment is done    If we need to change your medication,our office will call you. Urinary Tract Infection in Women: Care Instructions  Your Care Instructions    A urinary tract infection, or UTI, is a general term for an infection anywhere between the kidneys and the urethra (where urine comes out). Most UTIs are bladder infections. They often cause pain or burning when you urinate. UTIs are caused by bacteria and can be cured with antibiotics. Be sure to complete your treatment so that the infection goes away. Follow-up care is a key part of your treatment and safety. Be sure to make and go to all appointments, and call your doctor if you are having problems. It's also a good idea to know your test results and keep a list of the medicines you take. How can you care for yourself at home? · Take your antibiotics as directed. Do not stop taking them just because you feel better. You need to take the full course of antibiotics. · Drink extra water and other fluids for the next day or two. This may help wash out the bacteria that are causing the infection. (If you have kidney, heart, or liver disease and have to limit fluids, talk with your doctor before you increase your fluid intake.)  · Avoid drinks that are carbonated or have caffeine. They can irritate the bladder. · Urinate often. Try to empty your bladder each time. · To relieve pain, take a hot bath or lay a heating pad set on low over your lower belly or genital area. Never go to sleep with a heating pad in place. To prevent UTIs  · Drink plenty of water each day.  This helps you urinate often, which clears bacteria from your system. (If you have kidney, heart, or liver disease and have to limit fluids, talk with your doctor before you increase your fluid intake.)  · Urinate when you need to. · Urinate right after you have sex. · Change sanitary pads often. · Avoid douches, bubble baths, feminine hygiene sprays, and other feminine hygiene products that have deodorants. · After going to the bathroom, wipe from front to back. When should you call for help? Call your doctor now or seek immediate medical care if:  · Symptoms such as fever, chills, nausea, or vomiting get worse or appear for the first time. · You have new pain in your back just below your rib cage. This is called flank pain. · There is new blood or pus in your urine. · You have any problems with your antibiotic medicine. Watch closely for changes in your health, and be sure to contact your doctor if:  · You are not getting better after taking an antibiotic for 2 days. · Your symptoms go away but then come back. Where can you learn more? Go to http://nikolay-kendra.info/. Enter S079 in the search box to learn more about \"Urinary Tract Infection in Women: Care Instructions. \"  Current as of: November 28, 2016  Content Version: 11.2  © 5601-3665 [employer]. Care instructions adapted under license by [employer] (which disclaims liability or warranty for this information). If you have questions about a medical condition or this instruction, always ask your healthcare professional. [place] [address] any warranty or liability for your use of this information. Preventing Falls: Care Instructions  Your Care Instructions  Getting around your home safely can be a challenge if you have injuries or health problems that make it easy for you to fall. Loose rugs and furniture in walkways are among the dangers for many older people who have problems walking or who have poor eyesight.  People who have conditions such as arthritis, osteoporosis, or dementia also have to be careful not to fall. You can make your home safer with a few simple measures. Follow-up care is a key part of your treatment and safety. Be sure to make and go to all appointments, and call your doctor if you are having problems. It's also a good idea to know your test results and keep a list of the medicines you take. How can you care for yourself at home? Taking care of yourself  · You may get dizzy if you do not drink enough water. To prevent dehydration, drink plenty of fluids, enough so that your urine is light yellow or clear like water. Choose water and other caffeine-free clear liquids. If you have kidney, heart, or liver disease and have to limit fluids, talk with your doctor before you increase the amount of fluids you drink. · Exercise regularly to improve your strength, muscle tone, and balance. Walk if you can. Swimming may be a good choice if you cannot walk easily. · Have your vision and hearing checked each year or any time you notice a change. If you have trouble seeing and hearing, you might not be able to avoid objects and could lose your balance. · Know the side effects of the medicines you take. Ask your doctor or pharmacist whether the medicines you take can affect your balance. Sleeping pills or sedatives can affect your balance. · Limit the amount of alcohol you drink. Alcohol can impair your balance and other senses. · Ask your doctor whether calluses or corns on your feet need to be removed. If you wear loose-fitting shoes because of calluses or corns, you can lose your balance and fall. · Talk to your doctor if you have numbness in your feet. Preventing falls at home  · Remove raised doorway thresholds, throw rugs, and clutter. Repair loose carpet or raised areas in the floor. · Move furniture and electrical cords to keep them out of walking paths.   · Use nonskid floor wax, and wipe up spills right away, especially on ceramic tile floors. · If you use a walker or cane, put rubber tips on it. If you use crutches, clean the bottoms of them regularly with an abrasive pad, such as steel wool. · Keep your house well lit, especially Ver[place]a Herb, and outside walkways. Use night-lights in areas such as hallways and bathrooms. Add extra light switches or use remote switches (such as switches that go on or off when you clap your hands) to make it easier to turn lights on if you have to get up during the night. · Install sturdy handrails on stairways. · Move items in your cabinets so that the things you use a lot are on the lower shelves (about waist level). · Keep a cordless phone and a flashlight with new batteries by your bed. If possible, put a phone in each of the main rooms of your house, or carry a cell phone in case you fall and cannot reach a phone. Or, you can wear a device around your neck or wrist. You push a button that sends a signal for help. · Wear low-heeled shoes that fit well and give your feet good support. Use footwear with nonskid soles. Check the heels and soles of your shoes for wear. Repair or replace worn heels or soles. · Do not wear socks without shoes on wood floors. · Walk on the grass when the sidewalks are slippery. If you live in an area that gets snow and ice in the winter, sprinkle salt on slippery steps and sidewalks. Preventing falls in the bath  · Install grab bars and nonskid mats inside and outside your shower or tub and near the toilet and sinks. · Use shower chairs and bath benches. · Use a hand-held shower head that will allow you to sit while showering. · Get into a tub or shower by putting the weaker leg in first. Get out of a tub or shower with your strong side first.  · Repair loose toilet seats and consider installing a raised toilet seat to make getting on and off the toilet easier. · Keep your bathroom door unlocked while you are in the shower.   Where can you learn more? Go to http://nikolay-kendra.info/. Enter 0476 79 69 71 in the search box to learn more about \"Preventing Falls: Care Instructions. \"  Current as of: August 4, 2016  Content Version: 11.2  © 3033-9672 [employer]. Care instructions adapted under license by [employer] (which disclaims liability or warranty for this information). If you have questions about a medical condition or this instruction, always ask your healthcare professional. [place] [address] any warranty or liability for your use of this information.

## 2017-06-08 DIAGNOSIS — N39.0 RECURRENT UTI: ICD-10-CM

## 2017-06-08 RX ORDER — AMOXICILLIN AND CLAVULANATE POTASSIUM 875; 125 MG/1; MG/1
1 TABLET, FILM COATED ORAL EVERY 12 HOURS
Qty: 6 TAB | Refills: 0 | Status: SHIPPED | OUTPATIENT
Start: 2017-06-08 | End: 2017-06-11

## 2017-06-08 NOTE — TELEPHONE ENCOUNTER
Called [facility], spoke with Christopher blandon to cancel antibiotic sent yesterday, resubmitted to local pharmacy.

## 2017-06-10 LAB
APPEARANCE UR: ABNORMAL
BACTERIA #/AREA URNS HPF: ABNORMAL /[HPF]
BACTERIA UR CULT: ABNORMAL
BILIRUB UR QL STRIP: NEGATIVE
CASTS URNS QL MICRO: ABNORMAL /LPF
COLOR UR: YELLOW
EPI CELLS #/AREA URNS HPF: ABNORMAL /HPF
GLUCOSE UR QL: NEGATIVE
HGB UR QL STRIP: NEGATIVE
KETONES UR QL STRIP: NEGATIVE
LEUKOCYTE ESTERASE UR QL STRIP: ABNORMAL
MICRO URNS: ABNORMAL
MUCOUS THREADS URNS QL MICRO: PRESENT
NITRITE UR QL STRIP: NEGATIVE
PH UR STRIP: 8.5 [PH] (ref 5–7.5)
PROT UR QL STRIP: ABNORMAL
RBC #/AREA URNS HPF: ABNORMAL /HPF
SP GR UR: 1.02 (ref 1–1.03)
UROBILINOGEN UR STRIP-MCNC: 0.2 MG/DL (ref 0.2–1)
WBC #/AREA URNS HPF: ABNORMAL /HPF

## 2017-06-14 ENCOUNTER — TELEPHONE (OUTPATIENT)
Dept: INTERNAL MEDICINE CLINIC | Age: 82
End: 2017-06-14

## 2017-06-14 RX ORDER — AMOXICILLIN AND CLAVULANATE POTASSIUM 875; 125 MG/1; MG/1
1 TABLET, FILM COATED ORAL 2 TIMES DAILY
Qty: 10 TAB | Refills: 0 | Status: SHIPPED | OUTPATIENT
Start: 2017-06-14 | End: 2017-10-05

## 2017-06-14 NOTE — TELEPHONE ENCOUNTER
Patient woke up this morning with uti symptoms again. She had been doing well for about a week. Should she take the 3 pills again? Please call asap.

## 2017-07-12 DIAGNOSIS — F41.9 ANXIETY: ICD-10-CM

## 2017-07-12 NOTE — TELEPHONE ENCOUNTER
Pt also asking if she can get a rx for the bactrim which Dr. Benjamin Lerner has given her before, to help her not go so often. Pt states she has missed Sunday school and [employer] the past 6 weeks because of the urine problem and would really like to go. Pt is having US of her kidneys on the 26th. Pt only has 2 lorazepam tabs left, so asked if this could be sent today.

## 2017-07-12 NOTE — TELEPHONE ENCOUNTER
Please check . If I am last prescriber in 60 days okay to fill as requested. Regarding UTI she is now followed by Dr. Isak Hernandez please assist her in contacting Dr. Isak Hernandez for guidance.

## 2017-07-13 ENCOUNTER — DOCUMENTATION ONLY (OUTPATIENT)
Dept: INTERNAL MEDICINE CLINIC | Age: 82
End: 2017-07-13

## 2017-07-13 RX ORDER — LORAZEPAM 1 MG/1
TABLET ORAL
Qty: 180 TAB | Refills: 0 | OUTPATIENT
Start: 2017-07-13 | End: 2017-10-11 | Stop reason: SDUPTHER

## 2017-09-26 ENCOUNTER — HOSPITAL ENCOUNTER (OUTPATIENT)
Dept: LAB | Age: 82
Discharge: HOME | End: 2017-09-26
Payer: MEDICARE

## 2017-09-26 PROCEDURE — 80053 COMPREHEN METABOLIC PANEL: CPT

## 2017-09-26 PROCEDURE — 36415 COLL VENOUS BLD VENIPUNCTURE: CPT

## 2017-09-26 PROCEDURE — 82728 ASSAY OF FERRITIN: CPT

## 2017-09-26 PROCEDURE — 83550 IRON BINDING TEST: CPT

## 2017-09-26 PROCEDURE — 85025 COMPLETE CBC W/AUTO DIFF WBC: CPT

## 2017-09-27 LAB
ALBUMIN SERPL-MCNC: 4.3 G/DL (ref 3.5–4.7)
ALBUMIN/GLOB SERPL: 1.7 {RATIO} (ref 1.2–2.2)
ALP SERPL-CCNC: 57 IU/L (ref 39–117)
ALT SERPL-CCNC: 9 IU/L (ref 0–32)
AST SERPL-CCNC: 23 IU/L (ref 0–40)
BASOPHILS # BLD AUTO: 0.1 X10E3/UL (ref 0–0.2)
BASOPHILS NFR BLD AUTO: 2 %
BILIRUB SERPL-MCNC: 0.3 MG/DL (ref 0–1.2)
BUN SERPL-MCNC: 17 MG/DL (ref 8–27)
BUN/CREAT SERPL: 18 (ref 12–28)
CALCIUM SERPL-MCNC: 9.9 MG/DL (ref 8.7–10.3)
CHLORIDE SERPL-SCNC: 101 MMOL/L (ref 96–106)
CO2 SERPL-SCNC: 25 MMOL/L (ref 18–29)
CREAT SERPL-MCNC: 0.95 MG/DL (ref 0.57–1)
EOSINOPHIL # BLD AUTO: 0 X10E3/UL (ref 0–0.4)
EOSINOPHIL NFR BLD AUTO: 0 %
ERYTHROCYTE [DISTWIDTH] IN BLOOD BY AUTOMATED COUNT: 20.3 % (ref 12.3–15.4)
FERRITIN SERPL-MCNC: 19 NG/ML (ref 15–150)
GLOBULIN SER CALC-MCNC: 2.6 G/DL (ref 1.5–4.5)
GLUCOSE SERPL-MCNC: 83 MG/DL (ref 65–99)
HCT VFR BLD AUTO: 29.8 % (ref 34–46.6)
HGB BLD-MCNC: 9.2 G/DL (ref 11.1–15.9)
IMM GRANULOCYTES # BLD: 0 X10E3/UL (ref 0–0.1)
IMM GRANULOCYTES NFR BLD: 0 %
IRON SATN MFR SERPL: 7 % (ref 15–55)
IRON SERPL-MCNC: 28 UG/DL (ref 27–139)
LYMPHOCYTES # BLD AUTO: 0.6 X10E3/UL (ref 0.7–3.1)
LYMPHOCYTES NFR BLD AUTO: 17 %
MCH RBC QN AUTO: 23.4 PG (ref 26.6–33)
MCHC RBC AUTO-ENTMCNC: 30.9 G/DL (ref 31.5–35.7)
MCV RBC AUTO: 76 FL (ref 79–97)
MONOCYTES # BLD AUTO: 0.6 X10E3/UL (ref 0.1–0.9)
MONOCYTES NFR BLD AUTO: 17 %
NEUTROPHILS # BLD AUTO: 2.3 X10E3/UL (ref 1.4–7)
NEUTROPHILS NFR BLD AUTO: 64 %
PLATELET # BLD AUTO: 448 X10E3/UL (ref 150–379)
POTASSIUM SERPL-SCNC: 4.8 MMOL/L (ref 3.5–5.2)
PROT SERPL-MCNC: 6.9 G/DL (ref 6–8.5)
RBC # BLD AUTO: 3.94 X10E6/UL (ref 3.77–5.28)
SODIUM SERPL-SCNC: 139 MMOL/L (ref 134–144)
TIBC SERPL-MCNC: 379 UG/DL (ref 250–450)
UIBC SERPL-MCNC: 351 UG/DL (ref 118–369)
WBC # BLD AUTO: 3.5 X10E3/UL (ref 3.4–10.8)

## 2017-10-05 ENCOUNTER — OFFICE VISIT (OUTPATIENT)
Dept: INTERNAL MEDICINE CLINIC | Age: 82
End: 2017-10-05

## 2017-10-05 VITALS
BODY MASS INDEX: 21.93 KG/M2 | DIASTOLIC BLOOD PRESSURE: 52 MMHG | HEART RATE: 82 BPM | RESPIRATION RATE: 16 BRPM | TEMPERATURE: 97.5 F | WEIGHT: 108.8 LBS | SYSTOLIC BLOOD PRESSURE: 110 MMHG | OXYGEN SATURATION: 94 % | HEIGHT: 59 IN

## 2017-10-05 DIAGNOSIS — F41.9 ANXIETY: ICD-10-CM

## 2017-10-05 DIAGNOSIS — E78.00 HYPERCHOLESTEROLEMIA: ICD-10-CM

## 2017-10-05 DIAGNOSIS — E03.9 ACQUIRED HYPOTHYROIDISM: ICD-10-CM

## 2017-10-05 DIAGNOSIS — M17.4 OTHER SECONDARY OSTEOARTHRITIS OF BOTH KNEES: ICD-10-CM

## 2017-10-05 DIAGNOSIS — D50.8 OTHER IRON DEFICIENCY ANEMIA: Primary | ICD-10-CM

## 2017-10-05 DIAGNOSIS — N39.0 RECURRENT UTI: ICD-10-CM

## 2017-10-05 RX ORDER — CEPHALEXIN 250 MG/1
500 CAPSULE ORAL DAILY
COMMUNITY
Start: 2017-10-05 | End: 2018-04-18

## 2017-10-05 NOTE — MR AVS SNAPSHOT
Visit Information Date & Time Provider Department Dept. Phone Encounter #  
 10/5/2017 10:15 AM Errol Barraza MD [address] [address] Internal Medicine [phone] [id number] Follow-up Instructions Return in about 4 months (around 2/5/2018) for Medicare Wellness, Physical - 30 minute appointment. Upcoming Health Maintenance Date Due  
 MEDICARE YEARLY EXAM 3/8/2018 GLAUCOMA SCREENING Q2Y 4/18/2019 DTaP/Tdap/Td series (2 - Td) 2/3/2024 Allergies as of 10/5/2017  Review Complete On: 10/5/2017 By: Errol Barraza MD  
  
 Severity Noted Reaction Type Reactions Ciprofibrate  08/18/2015    Nausea Only Codeine  03/12/2013    Nausea Only Erythromycin  03/25/2013    Nausea Only Tetracycline  03/25/2013    Nausea Only Tramadol  03/12/2013    Other (comments) \"drunk\" Current Immunizations  Reviewed on 3/25/2013 Name Date Influenza Vaccine 9/25/2012 Pneumococcal Polysaccharide (PPSV-23) 3/27/2013  4:46 PM  
  
 Not reviewed this visit You Were Diagnosed With   
  
 Codes Comments Other iron deficiency anemia    -  Primary ICD-10-CM: D50.8 ICD-9-CM: 280.8 Hypercholesterolemia     ICD-10-CM: E78.00 ICD-9-CM: 272.0 Anxiety     ICD-10-CM: F41.9 ICD-9-CM: 300.00 Other secondary osteoarthritis of both knees     ICD-10-CM: M17.4 ICD-9-CM: 715.26 Recurrent UTI     ICD-10-CM: N39.0 ICD-9-CM: 599.0 Acquired hypothyroidism     ICD-10-CM: E03.9 ICD-9-CM: 026. 9 Vitals BP Pulse Temp Resp Height(growth percentile) Weight(growth percentile) 110/52 (BP 1 Location: Right arm, BP Patient Position: Sitting) 82 97.5 °F (36.4 °C) (Oral) 16 4' 11\" (1.499 m) 108 lb 12.8 oz (49.4 kg) SpO2 BMI OB Status Smoking Status 94% 21.97 kg/m2 Hysterectomy Never Smoker Vitals History BMI and BSA Data Body Mass Index Body Surface Area  
 21.97 kg/m 2 1.43 m 2 [phone] Your Updated Medication List  
  
   
This list is accurate as of: 10/5/17 11:01 AM.  Always use your most recent med list.  
  
  
  
  
 acetaminophen 650 mg Tab Take 650 mg by mouth every six (6) hours. amitriptyline 10 mg tablet Commonly known as:  ELAVIL TAKE 1 TABLET TWICE A DAY  
  
 aspirin delayed-release 81 mg tablet Take 81 mg by mouth daily. AZOPT 1 % ophthalmic suspension Generic drug:  brinzolamide Administer 1 Drop to left eye two (2) times a day. CENTRUM SILVER Tab tablet Generic drug:  multivitamins-minerals-lutein Take 1 Tab by mouth daily. cephALEXin 250 mg capsule Commonly known as:  Lisandra Marco A Take 2 Caps by mouth daily. ferrous sulfate 142 mg (45 mg iron) ER tablet Commonly known as:  SLOW FE Take 1 Tab by mouth Daily (before breakfast). ipratropium 0.06 % nasal spray Commonly known as:  ATROVENT  
2 Sprays by Both Nostrils route two (2) times a day. levothyroxine 50 mcg tablet Commonly known as:  SYNTHROID  
TAKE ONE-HALF (1/2) TABLET DAILY BEFORE BREAKFAST LORazepam 1 mg tablet Commonly known as:  ATIVAN  
1 tab by mouth every twelve hrs as needed for anxiety  
  
 raNITIdine 150 mg tablet Commonly known as:  ZANTAC Take 150 mg by mouth daily. senna-docusate 8.6-50 mg per tablet Commonly known as:  Lanney Brunner Take 1 Tab by mouth two (2) times a day. simvastatin 10 mg tablet Commonly known as:  ZOCOR Take 10 mg by mouth nightly. Prescriptions Sent to Pharmacy Refills  
 ferrous sulfate (SLOW FE) 142 mg (45 mg iron) ER tablet 1 Sig: Take 1 Tab by mouth Daily (before breakfast). Class: Normal  
 Pharmacy: [address], [address] [place] [address] #: [phone] Route: Oral  
  
We Performed the Following CBC WITH AUTOMATED DIFF [41100 CPT(R)] FERRITIN [37357 CPT(R)] IRON PROFILE [id number] CPT(R)] LIPID PANEL [10993 CPT(R)] METABOLIC PANEL, COMPREHENSIVE [29936 CPT(R)] THYROID PANEL [id number] CPT(R)] TSH 3RD GENERATION [20289 CPT(R)] Follow-up Instructions Return in about 4 months (around 2/5/2018) for Medicare Wellness, Physical - 30 minute appointment. Patient Instructions It was a pleasure to see you! As discussed: Your anemia has worsen. Start iron supplement. I have ordered your age appropriate labs please complete them. You will need to fast 10-12hrs before your lab appointment. Complete labs two weeks before your next appointment. Iron Deficiency Anemia: Care Instructions Your Care Instructions Anemia means that you do not have enough red blood cells. Red blood cells carry oxygen around your body. When you have anemia, it can make you pale, weak, and tired. Many things can cause anemia. The most common cause is loss of blood. This can happen if you have heavy menstrual periods. It can also happen if you have bleeding in your stomach or bowel. You can also get anemia if you don't have enough iron in your diet or if it's hard for your body to absorb iron. In some cases, pregnancy causes anemia. That's because a pregnant woman needs more iron. Your doctor may do more tests to find the cause of your anemia. If a disease or other health problem is causing it, your doctor will treat that problem. It's important to follow up with your doctor to make sure that your iron level returns to normal. 
Follow-up care is a key part of your treatment and safety. Be sure to make and go to all appointments, and call your doctor if you are having problems. It's also a good idea to know your test results and keep a list of the medicines you take. How can you care for yourself at home? · If your doctor recommended iron pills, take them as directed. ¨ Try to take the pills on an empty stomach. You can do this about 1 hour before or 2 hours after meals. But you may need to take iron with food to avoid an upset stomach. ¨ Do not take antacids or drink milk or anything with caffeine within 2 hours of when you take your iron. They can keep your body from absorbing the iron well. ¨ Vitamin C helps your body absorb iron. You may want to take iron pills with a glass of orange juice or some other food high in vitamin C. 
¨ Iron pills may cause stomach problems. These include heartburn, nausea, diarrhea, constipation, and cramps. It can help to drink plenty of fluids and include fruits, vegetables, and fiber in your diet. ¨ It's normal for iron pills to make your stool a greenish or grayish black. But internal bleeding can also cause dark stool. So it's important to tell your doctor about any color changes. ¨ Call your doctor if you think you are having a problem with your iron pills. Even after you start to feel better, it will take several months for your body to build up its supply of iron. ¨ If you miss a pill, don't take a double dose. ¨ Keep iron pills out of the reach of small children. Too much iron can be very dangerous. · Eat foods with a lot of iron. These include red meat, shellfish, poultry, and eggs. They also include beans, raisins, whole-grain bread, and leafy green vegetables. · Steam your vegetables. This is the best way to prepare them if you want to get as much iron as possible. · Be safe with medicines. Do not take nonsteroidal anti-inflammatory pain relievers unless your doctor tells you to. These include aspirin, naproxen (Aleve), and ibuprofen (Advil, Motrin). · Liquid iron can stain your teeth. But you can mix it with water or juice and drink it with a straw. Then it won't get on your teeth. When should you call for help? Call 911 anytime you think you may need emergency care. For example, call if: · You passed out (lost consciousness). · You vomit blood or what looks like coffee grounds. · You pass maroon or very bloody stools. Call your doctor now or seek immediate medical care if: 
· Your stools are black and look like tar, or they have streaks of blood. · You are dizzy or lightheaded, or you feel like you may faint. Watch closely for changes in your health, and be sure to contact your doctor if: 
· Your fatigue and weakness continue or get worse. · You have side effects from taking iron pills, such as nausea, vomiting, constipation, diarrhea, or heartburn. · You do not get better as expected. Where can you learn more? Go to http://nikolay-kendra.info/. Enter Y842 in the search box to learn more about \"Iron Deficiency Anemia: Care Instructions. \" Current as of: October 13, 2016 Content Version: 11.3 © 6423-9774 [employer]. Care instructions adapted under license by [employer] (which disclaims liability or warranty for this information). If you have questions about a medical condition or this instruction, always ask your healthcare professional. [address] any warranty or liability for your use of this information. Introducing [facility]! Augie Smith introduces Mamina Shkola patient portal. Now you can access parts of your medical record, email your doctor's office, and request medication refills online. 1. In your internet browser, go to [email or web address] 2. Click on the First Time User? Click Here link in the Sign In box. You will see the New Member Sign Up page. 3. Enter your Mam[employer] Shkola Access Code exactly as it appears below. You will not need to use this code after youve completed the sign-up process. If you do not sign up before the expiration date, you must request a new code. · Mamina Shkola Access Code: [email or web address] Expires: 1/3/2018  9:53 AM 
 
 4. Enter the last four digits of your Social Security Number (xxxx) and Date of Birth (mm/dd/yyyy) as indicated and click Submit. You will be taken to the next sign-up page. 5. Create a Humanco ID. This will be your Humanco login ID and cannot be changed, so think of one that is secure and easy to remember. 6. Create a Humanco password. You can change your password at any time. 7. Enter your Password Reset Question and Answer. This can be used at a later time if you forget your password. 8. Enter your e-mail address. You will receive e-mail notification when new information is available in [address] 9. Click Sign Up. You can now view and download portions of your medical record. 10. Click the Download Summary menu link to download a portable copy of your medical information. If you have questions, please visit the Frequently Asked Questions section of the Humanco website. Remember, Humanco is NOT to be used for urgent needs. For medical emergencies, dial 911. Now available from your iPhone and Android! Please provide this summary of care documentation to your next provider. Your primary care clinician is listed as Marlon Hurst. If you have any questions after today's visit, please call [phone].

## 2017-10-05 NOTE — PROGRESS NOTES
Chief Complaint   Patient presents with    Hypothyroidism     1. Have you been to the ER, urgent care clinic since your last visit? Hospitalized since your last visit? No    2. Have you seen or consulted any other health care providers outside of the [address] [place] [address] since your last visit? Include any pap smears or colon screening.  Yes When: 4 months ago Where: Urologist Reason for visit: recurrent UTI

## 2017-10-05 NOTE — PATIENT INSTRUCTIONS
It was a pleasure to see you! As discussed: Your anemia has worsen. Start iron supplement. I have ordered your age appropriate labs please complete them. You will need to fast 10-12hrs before your lab appointment. Complete labs two weeks before your next appointment. Iron Deficiency Anemia: Care Instructions  Your Care Instructions    Anemia means that you do not have enough red blood cells. Red blood cells carry oxygen around your body. When you have anemia, it can make you pale, weak, and tired. Many things can cause anemia. The most common cause is loss of blood. This can happen if you have heavy menstrual periods. It can also happen if you have bleeding in your stomach or bowel. You can also get anemia if you don't have enough iron in your diet or if it's hard for your body to absorb iron. In some cases, pregnancy causes anemia. That's because a pregnant woman needs more iron. Your doctor may do more tests to find the cause of your anemia. If a disease or other health problem is causing it, your doctor will treat that problem. It's important to follow up with your doctor to make sure that your iron level returns to normal.  Follow-up care is a key part of your treatment and safety. Be sure to make and go to all appointments, and call your doctor if you are having problems. It's also a good idea to know your test results and keep a list of the medicines you take. How can you care for yourself at home? · If your doctor recommended iron pills, take them as directed. ¨ Try to take the pills on an empty stomach. You can do this about 1 hour before or 2 hours after meals. But you may need to take iron with food to avoid an upset stomach. ¨ Do not take antacids or drink milk or anything with caffeine within 2 hours of when you take your iron. They can keep your body from absorbing the iron well. ¨ Vitamin C helps your body absorb iron.  You may want to take iron pills with a glass of orange juice or some other food high in vitamin C.  ¨ Iron pills may cause stomach problems. These include heartburn, nausea, diarrhea, constipation, and cramps. It can help to drink plenty of fluids and include fruits, vegetables, and fiber in your diet. ¨ It's normal for iron pills to make your stool a greenish or grayish black. But internal bleeding can also cause dark stool. So it's important to tell your doctor about any color changes. ¨ Call your doctor if you think you are having a problem with your iron pills. Even after you start to feel better, it will take several months for your body to build up its supply of iron. ¨ If you miss a pill, don't take a double dose. ¨ Keep iron pills out of the reach of small children. Too much iron can be very dangerous. · Eat foods with a lot of iron. These include red meat, shellfish, poultry, and eggs. They also include beans, raisins, whole-grain bread, and leafy green vegetables. · Steam your vegetables. This is the best way to prepare them if you want to get as much iron as possible. · Be safe with medicines. Do not take nonsteroidal anti-inflammatory pain relievers unless your doctor tells you to. These include aspirin, naproxen (Aleve), and ibuprofen (Advil, Motrin). · Liquid iron can stain your teeth. But you can mix it with water or juice and drink it with a straw. Then it won't get on your teeth. When should you call for help? Call 911 anytime you think you may need emergency care. For example, call if:  · You passed out (lost consciousness). · You vomit blood or what looks like coffee grounds. · You pass maroon or very bloody stools. Call your doctor now or seek immediate medical care if:  · Your stools are black and look like tar, or they have streaks of blood. · You are dizzy or lightheaded, or you feel like you may faint.   Watch closely for changes in your health, and be sure to contact your doctor if:  · Your fatigue and weakness continue or get worse.  · You have side effects from taking iron pills, such as nausea, vomiting, constipation, diarrhea, or heartburn. · You do not get better as expected. Where can you learn more? Go to http://nikolay-kendra.info/. Enter B648 in the search box to learn more about \"Iron Deficiency Anemia: Care Instructions. \"  Current as of: October 13, 2016  Content Version: 11.3  © 0435-0574 [employer]. Care instructions adapted under license by [employer] (which disclaims liability or warranty for this information). If you have questions about a medical condition or this instruction, always ask your healthcare professional. [place] [address] any warranty or liability for your use of this information.

## 2017-10-05 NOTE — PROGRESS NOTES
HISTORY OF PRESENT ILLNESS  Adam Franz is a 80 y.o. female. HPI  Anemia  Patient presents for presents evaluation of anemia. Anemia was found by chronic. It has been present for several years. Associated signs & symptoms: none. Cardiovascular Review  The patient has hyperlipidemia and Atrial Fibrillation. Diet and Lifestyle: exercises sporadically, nonsmoker  Home BP Monitoring: is not measured at home. Pertinent ROS: taking medications as instructed, no medication side effects noted, no TIA's, no chest pain on exertion, no dyspnea on exertion, no swelling of ankles. Hypothyroidism  Patient is seen for followup of hypothyroidism. Thyroid ROS: denies fatigue, weight changes, heat/cold intolerance, bowel/skin changes or CVS symptoms. Health Maintenance   Topic Date Due    MEDICARE YEARLY EXAM  03/08/2018    GLAUCOMA SCREENING Q2Y  04/18/2019    DTaP/Tdap/Td series (2 - Td) 02/03/2024    OSTEOPOROSIS SCREENING (DEXA)  Completed    ZOSTER VACCINE AGE 60>  Completed    Pneumococcal 65+ Low/Medium Risk  Completed    INFLUENZA AGE 9 TO ADULT  Completed         Review of Systems   Constitutional: Negative for diaphoresis, fever and weight loss. Eyes: Negative for blurred vision and pain. Respiratory: Negative for shortness of breath. Cardiovascular: Negative for chest pain, orthopnea and leg swelling. Neurological: Negative for focal weakness and headaches. Psychiatric/Behavioral: Negative for depression.      Patient Active Problem List    Diagnosis Date Noted    Paroxysmal a-fib ([facility] [address] 09/17/2014    Glaucoma 09/17/2014    Hypercholesterolemia 09/17/2014    Anxiety 09/17/2014    Gas 09/17/2014    DJD (degenerative joint disease) of knee 03/26/2013       Current Outpatient Prescriptions   Medication Sig Dispense Refill    LORazepam (ATIVAN) 1 mg tablet 1 tab by mouth every twelve hrs as needed for anxiety 180 Tab 0    levothyroxine (SYNTHROID) 50 mcg tablet TAKE ONE-HALF (1/2) TABLET DAILY BEFORE BREAKFAST 45 Tab 4    amitriptyline (ELAVIL) 10 mg tablet TAKE 1 TABLET TWICE A  Tab 3    aspirin delayed-release 81 mg tablet Take 81 mg by mouth daily.  ranitidine (ZANTAC) 150 mg tablet Take 150 mg by mouth daily.  acetaminophen 650 mg Tab Take 650 mg by mouth every six (6) hours. (Patient taking differently: Take 650 mg by mouth every six (6) hours as needed.) 1 Tab 0    senna-docusate (PERICOLACE) 8.6-50 mg per tablet Take 1 Tab by mouth two (2) times a day. (Patient taking differently: Take 1 Tab by mouth as needed.) 60 Tab 0    ipratropium (ATROVENT) 0.06 % nasal spray 2 Sprays by Both Nostrils route two (2) times a day.  brinzolamide (AZOPT) 1 % ophthalmic suspension Administer 1 Drop to left eye two (2) times a day.  simvastatin (ZOCOR) 10 mg tablet Take 10 mg by mouth nightly.  multivitamins-minerals-lutein (CENTRUM SILVER) Tab Take 1 Tab by mouth daily. Allergies   Allergen Reactions    Ciprofibrate Nausea Only    Codeine Nausea Only    Erythromycin Nausea Only    Tetracycline Nausea Only    Tramadol Other (comments)     \"drunk\"      Visit Vitals    /52 (BP 1 Location: Right arm, BP Patient Position: Sitting)    Pulse 82    Temp 97.5 °F (36.4 °C) (Oral)    Resp 16    Ht 4' 11\" (1.499 m)    Wt 108 lb 12.8 oz (49.4 kg)    SpO2 94%    BMI 21.97 kg/m2       Physical Exam   Constitutional: She is oriented to person, place, and time. She appears well-developed. No distress. Eyes: Conjunctivae are normal.   Cardiovascular: Normal rate, regular rhythm and normal heart sounds. Pulmonary/Chest: Effort normal and breath sounds normal. No respiratory distress. She has no wheezes. She has no rales. She exhibits no tenderness. Musculoskeletal: She exhibits edema (Ble: trace ). Neurological: She is alert and oriented to person, place, and time. Skin: Skin is warm. Psychiatric: She has a normal mood and affect. ASSESSMENT and PLAN  Diagnoses and all orders for this visit:    1. Other iron deficiency anemia-long history. No evidence of bleeding. Will resume iron as iron sat is low factors are within normal limits. And is low normal.  She has been on this in the past.  Continue to monitor hemoglobin every 4 months or sooner if clinically indicated by symptoms. Currently asymptomatic  -     ferrous sulfate (SLOW FE) 142 mg (45 mg iron) ER tablet; Take 1 Tab by mouth Daily (before breakfast). -     CBC WITH AUTOMATED DIFF  -     FERRITIN  -     IRON PROFILE    2. Hypercholesterolemia  -     LIPID PANEL  -     METABOLIC PANEL, COMPREHENSIVE    3. Anxiety-on lorazepam chronically. Attempt has been made to wean in the past but she had severe adverse effects given her prolonged use. We continue to monitor the [address]  and she is counseled on signs and symptoms and adverse effects of prolonged use. Continue as prescribed. Patient Education:  Reviewed concept of anxiety as biochemical imbalance of neurotransmitters and rationale for treatment. Instructed patient to contact office or 911 promptly should condition worsen or any new symptoms appear and provided on-call telephone numbers. IF THE PATIENT HAS ANY SUICIDAL OR HOMICIDAL IDEATION, CALL THE OFFICE, DISCUSS WITH A SUPPORT MEMBER OR GO TO THE ER IMMEDIATELY. Patient was agreeable with this    4. Other secondary osteoarthritis of both kneesstable    5. Recurrent UTI    6. Acquired hypothyroidism recent TFT within normal limits. Will check at next appointment.  -     TSH 3RD GENERATION  -     THYROID PANEL      Follow-up Disposition:  Return in about 4 months (around 2/5/2018) for Medicare Wellness, Physical - 30 minute appointment. Medication risks/benefits/costs/interactions/alternatives discussed with patient. Dustin Han  was given an after visit summary which includes diagnoses, current medications, & vitals.   she expressed understanding with the diagnosis and plan.

## 2017-10-11 ENCOUNTER — DOCUMENTATION ONLY (OUTPATIENT)
Dept: INTERNAL MEDICINE CLINIC | Age: 82
End: 2017-10-11

## 2017-10-11 DIAGNOSIS — F41.9 ANXIETY: ICD-10-CM

## 2017-10-11 RX ORDER — LORAZEPAM 1 MG/1
TABLET ORAL
Qty: 180 TAB | Refills: 0 | OUTPATIENT
Start: 2017-10-11 | End: 2018-01-08 | Stop reason: SDUPTHER

## 2017-10-30 RX ORDER — AMITRIPTYLINE HYDROCHLORIDE 10 MG/1
TABLET, FILM COATED ORAL
Qty: 180 TAB | Refills: 3 | Status: SHIPPED | OUTPATIENT
Start: 2017-10-30

## 2018-01-08 DIAGNOSIS — F41.9 ANXIETY: ICD-10-CM

## 2018-01-09 RX ORDER — LORAZEPAM 1 MG/1
TABLET ORAL
Qty: 180 TAB | Refills: 0 | OUTPATIENT
Start: 2018-01-09 | End: 2018-03-23 | Stop reason: SDUPTHER

## 2018-02-21 ENCOUNTER — HOSPITAL ENCOUNTER (OUTPATIENT)
Dept: LAB | Age: 83
Discharge: HOME | End: 2018-02-21
Payer: MEDICARE

## 2018-02-21 ENCOUNTER — OFFICE VISIT (OUTPATIENT)
Dept: INTERNAL MEDICINE CLINIC | Age: 83
End: 2018-02-21

## 2018-02-21 VITALS
HEART RATE: 84 BPM | WEIGHT: 103 LBS | DIASTOLIC BLOOD PRESSURE: 72 MMHG | BODY MASS INDEX: 20.76 KG/M2 | RESPIRATION RATE: 16 BRPM | TEMPERATURE: 98 F | OXYGEN SATURATION: 96 % | SYSTOLIC BLOOD PRESSURE: 128 MMHG | HEIGHT: 59 IN

## 2018-02-21 DIAGNOSIS — D50.8 OTHER IRON DEFICIENCY ANEMIA: ICD-10-CM

## 2018-02-21 DIAGNOSIS — F41.9 ANXIETY: ICD-10-CM

## 2018-02-21 DIAGNOSIS — E03.9 ACQUIRED HYPOTHYROIDISM: ICD-10-CM

## 2018-02-21 DIAGNOSIS — R63.4 WEIGHT LOSS: ICD-10-CM

## 2018-02-21 DIAGNOSIS — K59.09 CHRONIC CONSTIPATION: ICD-10-CM

## 2018-02-21 DIAGNOSIS — I48.0 PAROXYSMAL A-FIB (HCC): ICD-10-CM

## 2018-02-21 DIAGNOSIS — E78.00 HYPERCHOLESTEROLEMIA: Primary | ICD-10-CM

## 2018-02-21 DIAGNOSIS — N39.0 RECURRENT UTI: ICD-10-CM

## 2018-02-21 PROCEDURE — 80053 COMPREHEN METABOLIC PANEL: CPT

## 2018-02-21 PROCEDURE — 82728 ASSAY OF FERRITIN: CPT

## 2018-02-21 PROCEDURE — 36415 COLL VENOUS BLD VENIPUNCTURE: CPT

## 2018-02-21 PROCEDURE — 84443 ASSAY THYROID STIM HORMONE: CPT

## 2018-02-21 PROCEDURE — 83550 IRON BINDING TEST: CPT

## 2018-02-21 PROCEDURE — 85025 COMPLETE CBC W/AUTO DIFF WBC: CPT

## 2018-02-21 PROCEDURE — 84439 ASSAY OF FREE THYROXINE: CPT

## 2018-02-21 RX ORDER — POLYETHYLENE GLYCOL 3350 17 G/17G
17 POWDER, FOR SOLUTION ORAL DAILY
Qty: 537 G | Refills: 2 | Status: SHIPPED | OUTPATIENT
Start: 2018-02-21

## 2018-02-21 NOTE — PATIENT INSTRUCTIONS
It was a pleasure to see you! As discussed:    I have ordered your age appropriate labs please complete them. You will need to fast 10-12hrs before your lab appointment. Constipation: Care Instructions  Your Care Instructions    Constipation means that you have a hard time passing stools (bowel movements). People pass stools from 3 times a day to once every 3 days. What is normal for you may be different. Constipation may occur with pain in the rectum and cramping. The pain may get worse when you try to pass stools. Sometimes there are small amounts of bright red blood on toilet paper or the surface of stools. This is because of enlarged veins near the rectum (hemorrhoids). A few changes in your diet and lifestyle may help you avoid ongoing constipation. Your doctor may also prescribe medicine to help loosen your stool. Some medicines can cause constipation. These include pain medicines and antidepressants. Tell your doctor about all the medicines you take. Your doctor may want to make a medicine change to ease your symptoms. Follow-up care is a key part of your treatment and safety. Be sure to make and go to all appointments, and call your doctor if you are having problems. It's also a good idea to know your test results and keep a list of the medicines you take. How can you care for yourself at home? · Drink plenty of fluids, enough so that your urine is light yellow or clear like water. If you have kidney, heart, or liver disease and have to limit fluids, talk with your doctor before you increase the amount of fluids you drink. · Include high-fiber foods in your diet each day. These include fruits, vegetables, beans, and whole grains. · Get at least 30 minutes of exercise on most days of the week. Walking is a good choice. You also may want to do other activities, such as running, swimming, cycling, or playing tennis or team sports.   · Take a fiber supplement, such as Citrucel or Metamucil, every day. Read and follow all instructions on the label. · Schedule time each day for a bowel movement. A daily routine may help. Take your time having your bowel movement. · Support your feet with a small step stool when you sit on the toilet. This helps flex your hips and places your pelvis in a squatting position. · Your doctor may recommend an over-the-counter laxative to relieve your constipation. Examples are Milk of Magnesia and MiraLax. Read and follow all instructions on the label. Do not use laxatives on a long-term basis. When should you call for help? Call your doctor now or seek immediate medical care if:  ? · You have new or worse belly pain. ? · You have new or worse nausea or vomiting. ? · You have blood in your stools. ? Watch closely for changes in your health, and be sure to contact your doctor if:  ? · Your constipation is getting worse. ? · You do not get better as expected. Where can you learn more? Go to http://nikolay-kendra.info/. Enter 21  in the search box to learn more about \"Constipation: Care Instructions. \"  Current as of: March 20, 2017  Content Version: 11.4  © 9419-4983 [employer]. Care instructions adapted under license by [employer] (which disclaims liability or warranty for this information). If you have questions about a medical condition or this instruction, always ask your healthcare professional. [place] [address] any warranty or liability for your use of this information.

## 2018-02-21 NOTE — PROGRESS NOTES
HISTORY OF PRESENT ILLNESS  Joe Sandy is a 80 y.o. female. HPI    Hypothyroidism  Patient is seen for followup of hypothyroidism. Thyroid ROS:+Chronic constipation-reports Nettie-Colace is expensive and tablet is too big. Denies fatigue, weight changes, heat/cold intolerance, bowel/skin changes or CVS symptoms. Cardiovascular Review  The patient has Atrial Fibrillation. Diet and Lifestyle: nonsmoker  Home BP Monitoring: is not measured at home. Pertinent ROS: taking medications as instructed-ASA , no medication side effects noted, no TIA's, no chest pain on exertion, no dyspnea on exertion, no swelling of ankles. Recurrent UTI  Followed by Urology, Dr. Kaylynn Rojas. Taking Cephalexin ppx. She had stopped Cephalexin for 4 days and developed symptoms prior to Urology appt on 2/13/18. She was unable to give Dr. Kaylynn Rojas a sample and would like to give it today. On Estrace. Records reviewed. Review of Systems   Constitutional: Negative for diaphoresis, fever and weight loss. Eyes: Negative for blurred vision and pain. Respiratory: Negative for shortness of breath. Cardiovascular: Negative for chest pain, orthopnea and leg swelling. Neurological: Negative for focal weakness and headaches. Psychiatric/Behavioral: Negative for depression. Patient Active Problem List    Diagnosis Date Noted    Acquired hypothyroidism 10/05/2017    Paroxysmal a-fib ([address] 09/17/2014    Glaucoma 09/17/2014    Hypercholesterolemia 09/17/2014    Anxiety 09/17/2014    Gas 09/17/2014    DJD (degenerative joint disease) of knee 03/26/2013       Current Outpatient Prescriptions   Medication Sig Dispense Refill    LORazepam (ATIVAN) 1 mg tablet take 1 tablet by mouth every 12 hours if needed for anxiety 180 Tab 0    amitriptyline (ELAVIL) 10 mg tablet TAKE 1 TABLET TWICE A  Tab 3    ferrous sulfate (SLOW FE) 142 mg (45 mg iron) ER tablet Take 1 Tab by mouth Daily (before breakfast).  90 Tab 1    cephALEXin (KEFLEX) 250 mg capsule Take 2 Caps by mouth daily.  levothyroxine (SYNTHROID) 50 mcg tablet TAKE ONE-HALF (1/2) TABLET DAILY BEFORE BREAKFAST 45 Tab 4    aspirin delayed-release 81 mg tablet Take 81 mg by mouth daily.  ranitidine (ZANTAC) 150 mg tablet Take 150 mg by mouth daily.  acetaminophen 650 mg Tab Take 650 mg by mouth every six (6) hours. (Patient taking differently: Take 650 mg by mouth every six (6) hours as needed.) 1 Tab 0    senna-docusate (PERICOLACE) 8.6-50 mg per tablet Take 1 Tab by mouth two (2) times a day. (Patient taking differently: Take 1 Tab by mouth as needed.) 60 Tab 0    ipratropium (ATROVENT) 0.06 % nasal spray 2 Sprays by Both Nostrils route two (2) times a day.  brinzolamide (AZOPT) 1 % ophthalmic suspension Administer 1 Drop to left eye two (2) times a day.  simvastatin (ZOCOR) 10 mg tablet Take 10 mg by mouth nightly.  multivitamins-minerals-lutein (CENTRUM SILVER) Tab Take 1 Tab by mouth daily. Allergies   Allergen Reactions    Ciprofibrate Nausea Only    Codeine Nausea Only    Erythromycin Nausea Only    Tetracycline Nausea Only    Tramadol Other (comments)     \"drunk\"      There were no vitals taken for this visit. Physical Exam   Constitutional: She is oriented to person, place, and time. She appears well-developed. No distress. Eyes: Conjunctivae are normal.   Neck: Neck supple. No thyromegaly present. Cardiovascular: Normal rate, regular rhythm and normal heart sounds. Pulmonary/Chest: Effort normal and breath sounds normal. No respiratory distress. She has no wheezes. She has no rales. She exhibits no tenderness. Abdominal: Bowel sounds are normal. She exhibits no distension. There is no tenderness. Lymphadenopathy:     She has no cervical adenopathy. Neurological: She is alert and oriented to person, place, and time. Skin: Skin is warm. Psychiatric: She has a normal mood and affect.        ASSESSMENT and PLAN  Diagnoses and all orders for this visit:    1. Hypercholesterolemia check lipid    2. Acquired hypothyroidism check TFTs.  -     TSH 3RD GENERATION  -     T4, FREE    3. Anxietystable. Risks and benefits of long term lorazepam use discussed with pt. Given age and duration of use, benefits of continuation outweighs the risk of discontinuation. Patient made aware of the side effects including falls, memory loss, confusion, etc will notify the office if this occurs. 4. Paroxysmal a-fib (HCC)normal sinus rhythm. Rate controlled today. -     METABOLIC PANEL, COMPREHENSIVE  -     TSH 3RD GENERATION  -     T4, FREE    5. Recurrent UTIproviding urinalysis sample will forward results to urology  -     URINALYSIS W/MICROSCOPIC    6. Weight lossgradual decrease in weight . Check labs. Will address further workup if needed  -     TSH 3RD GENERATION  -     T4, FREE    7. Other iron deficiency anemia iron supplementation likely contributing to her constipation. Check to see me  -     CBC WITH AUTOMATED DIFF  -     IRON PROFILE  -     FERRITIN    8. Chronic constipation likely due to iron supplementation. Will consider additional testing his weight loss continues. Switch to MiraLAX. -     polyethylene glycol (MIRALAX) 17 gram/dose powder; Take 17 g by mouth daily. Follow-up Disposition:  Return in about 4 months (around 6/21/2018) for Medicare Wellness, Physical - 30 minute appointment. Medication risks/benefits/costs/interactions/alternatives discussed with patient. Lindencresencio Espinoza  was given an after visit summary which includes diagnoses, current medications, & vitals. she expressed understanding with the diagnosis and plan.

## 2018-02-21 NOTE — MR AVS SNAPSHOT
[address] 
[phone] Patient: Ed Garduno MRN: [id number] :[id number] Visit Information Date & Time Provider Department Dept. Phone Encounter #  
 2018  8:15 AM Michael Guzman MD [address] [place] [address] Internal Medicine [phone] [id number] Follow-up Instructions Return in about 4 months (around 2018) for Medicare Wellness, Physical - 30 minute appointment. Upcoming Health Maintenance Date Due  
 MEDICARE YEARLY EXAM 3/8/2018 GLAUCOMA SCREENING Q2Y 2019 DTaP/Tdap/Td series (2 - Td) 2/3/2024 Allergies as of 2018  Review Complete On: 2018 By: Micahel Guzman MD  
  
 Severity Noted Reaction Type Reactions Ciprofibrate  2015    Nausea Only Codeine  2013    Nausea Only Erythromycin  2013    Nausea Only Tetracycline  2013    Nausea Only Tramadol  2013    Other (comments) \"drunk\" Current Immunizations  Reviewed on 3/25/2013 Name Date Influenza Vaccine 2012 Pneumococcal Polysaccharide (PPSV-23) 3/27/2013  4:46 PM  
  
 Not reviewed this visit You Were Diagnosed With   
  
 Codes Comments Hypercholesterolemia    -  Primary ICD-10-CM: E78.00 ICD-9-CM: 272.0 Acquired hypothyroidism     ICD-10-CM: E03.9 ICD-9-CM: 244.9 Anxiety     ICD-10-CM: F41.9 ICD-9-CM: 300.00 Paroxysmal a-fib (HCC)     ICD-10-CM: I48.0 ICD-9-CM: 427.31 Recurrent UTI     ICD-10-CM: N39.0 ICD-9-CM: 599.0 Weight loss     ICD-10-CM: R63.4 ICD-9-CM: 783.21 Other iron deficiency anemia     ICD-10-CM: D50.8 ICD-9-CM: 280.8 Chronic constipation     ICD-10-CM: K59.09 
ICD-9-CM: 564.00 Vitals BP Pulse Temp Resp Height(growth percentile) Weight(growth percentile)  128/72 (BP 1 Location: Right arm, BP Patient Position: Sitting) 84 98 °F (36.7 °C) (Oral) 16 4' 11\" (1.499 m) 103 lb (46.7 kg) SpO2 BMI OB Status Smoking Status 96% 20.8 kg/m2 Hysterectomy Never Smoker BMI and BSA Data Body Mass Index Body Surface Area  
 20.8 kg/m 2 1.39 m 2 Preferred Pharmacy Pharmacy Name Phone [facility], [address] [phone] Your Updated Medication List  
  
   
This list is accurate as of 2/21/18  8:59 AM.  Always use your most recent med list.  
  
  
  
  
 acetaminophen 650 mg Tab Take 650 mg by mouth every six (6) hours. amitriptyline 10 mg tablet Commonly known as:  ELAVIL TAKE 1 TABLET TWICE A DAY  
  
 aspirin delayed-release 81 mg tablet Take 81 mg by mouth daily. AZOPT 1 % ophthalmic suspension Generic drug:  brinzolamide Administer 1 Drop to left eye two (2) times a day. CENTRUM SILVER Tab tablet Generic drug:  multivitamins-minerals-lutein Take 1 Tab by mouth daily. cephALEXin 250 mg capsule Commonly known as:  Peyton Boyers Take 2 Caps by mouth daily. ferrous sulfate 142 mg (45 mg iron) ER tablet Commonly known as:  SLOW FE Take 1 Tab by mouth Daily (before breakfast). ipratropium 42 mcg (0.06 %) nasal spray Commonly known as:  ATROVENT  
2 Sprays by Both Nostrils route two (2) times a day. levothyroxine 50 mcg tablet Commonly known as:  SYNTHROID  
TAKE ONE-HALF (1/2) TABLET DAILY BEFORE BREAKFAST LORazepam 1 mg tablet Commonly known as:  ATIVAN  
take 1 tablet by mouth every 12 hours if needed for anxiety  
  
 polyethylene glycol 17 gram/dose powder Commonly known as:  Mevelyn Mall Take 17 g by mouth daily. raNITIdine 150 mg tablet Commonly known as:  ZANTAC Take 150 mg by mouth daily. senna-docusate 8.6-50 mg per tablet Commonly known as:  Jessy Downer Take 1 Tab by mouth two (2) times a day. simvastatin 10 mg tablet Commonly known as:  ZOCOR  
 Take 10 mg by mouth nightly. Prescriptions Sent to Pharmacy Refills  
 polyethylene glycol (MIRALAX) 17 gram/dose powder 2 Sig: Take 17 g by mouth daily. Class: Normal  
 Pharmacy: [id number] [address] [place] [address],[address] Ph #: [phone] Route: Oral  
  
We Performed the Following CBC WITH AUTOMATED DIFF [00315 CPT(R)] FERRITIN [40929 CPT(R)] IRON PROFILE [id number] CPT(R)] METABOLIC PANEL, COMPREHENSIVE [39023 CPT(R)] T4, FREE [id number] CPT(R)] TSH 3RD GENERATION [46213 CPT(R)] URINALYSIS W/MICROSCOPIC [14361 CPT(R)] Follow-up Instructions Return in about 4 months (around 6/21/2018) for Medicare Wellness, Physical - 30 minute appointment. Patient Instructions It was a pleasure to see you! As discussed: 
 
I have ordered your age appropriate labs please complete them. You will need to fast 10-12hrs before your lab appointment. Constipation: Care Instructions Your Care Instructions Constipation means that you have a hard time passing stools (bowel movements). People pass stools from 3 times a day to once every 3 days. What is normal for you may be different. Constipation may occur with pain in the rectum and cramping. The pain may get worse when you try to pass stools. Sometimes there are small amounts of bright red blood on toilet paper or the surface of stools. This is because of enlarged veins near the rectum (hemorrhoids). A few changes in your diet and lifestyle may help you avoid ongoing constipation. Your doctor may also prescribe medicine to help loosen your stool. Some medicines can cause constipation. These include pain medicines and antidepressants. Tell your doctor about all the medicines you take. Your doctor may want to make a medicine change to ease your symptoms. Follow-up care is a key part of your treatment and safety.  Be sure to make and go to all appointments, and call your doctor if you are having problems. It's also a good idea to know your test results and keep a list of the medicines you take. How can you care for yourself at home? · Drink plenty of fluids, enough so that your urine is light yellow or clear like water. If you have kidney, heart, or liver disease and have to limit fluids, talk with your doctor before you increase the amount of fluids you drink. · Include high-fiber foods in your diet each day. These include fruits, vegetables, beans, and whole grains. · Get at least 30 minutes of exercise on most days of the week. Walking is a good choice. You also may want to do other activities, such as running, swimming, cycling, or playing tennis or team sports. · Take a fiber supplement, such as Citrucel or Metamucil, every day. Read and follow all instructions on the label. · Schedule time each day for a bowel movement. A daily routine may help. Take your time having your bowel movement. · Support your feet with a small step stool when you sit on the toilet. This helps flex your hips and places your pelvis in a squatting position. · Your doctor may recommend an over-the-counter laxative to relieve your constipation. Examples are Milk of Magnesia and MiraLax. Read and follow all instructions on the label. Do not use laxatives on a long-term basis. When should you call for help? Call your doctor now or seek immediate medical care if: 
? · You have new or worse belly pain. ? · You have new or worse nausea or vomiting. ? · You have blood in your stools. ? Watch closely for changes in your health, and be sure to contact your doctor if: 
? · Your constipation is getting worse. ? · You do not get better as expected. Where can you learn more? Go to http://nikolay-kendra.info/. Enter 21 [phone] in the search box to learn more about \"Constipation: Care Instructions. \" Current as of: March 20, 2017 Content Version: 11.4 © 5870-5801 [employer], [employer]. Care instructions adapted under license by [employer] (which disclaims liability or warranty for this information). If you have questions about a medical condition or this instruction, always ask your healthcare professional. [address] any warranty or liability for your use of this information. Introducing [facility]! [facility] introduces [employer] patient portal. Now you can access parts of your medical record, email your doctor's office, and request medication refills online. 1. In your internet browser, go to [email or web address] 2. Click on the First Time User? Click Here link in the Sign In box. You will see the New Member Sign Up page. 3. Enter your [employer] Access Code exactly as it appears below. You will not need to use this code after youve completed the sign-up process. If you do not sign up before the expiration date, you must request a new code. · [employer] Access Code: [email or web address] Expires: 5/22/2018  8:59 AM 
 
4. Enter the last four digits of your Social Security Number (xxxx) and Date of Birth (mm/dd/yyyy) as indicated and click Submit. You will be taken to the next sign-up page. 5. Create a [employer] ID. This will be your [employer] login ID and cannot be changed, so think of one that is secure and easy to remember. 6. Create a [employer] password. You can change your password at any time. 7. Enter your Password Reset Question and Answer. This can be used at a later time if you forget your password. 8. Enter your e-mail address. You will receive e-mail notification when new information is available in [address] 9. Click Sign Up. You can now view and download portions of your medical record. 10. Click the Download Summary menu link to download a portable copy of your medical information. If you have questions, please visit the Frequently Asked Questions section of the [employer] website. Remember, [employer] is NOT to be used for urgent needs. For medical emergencies, dial 911. Now available from your iPhone and Android! Please provide this summary of care documentation to your next provider. Your primary care clinician is listed as Karlee Rondon. If you have any questions after today's visit, please call [phone].

## 2018-02-22 LAB
ALBUMIN SERPL-MCNC: 4.4 G/DL (ref 3.2–4.6)
ALBUMIN/GLOB SERPL: 1.6 {RATIO} (ref 1.2–2.2)
ALP SERPL-CCNC: 57 IU/L (ref 39–117)
ALT SERPL-CCNC: 15 IU/L (ref 0–32)
AST SERPL-CCNC: 25 IU/L (ref 0–40)
BASOPHILS # BLD AUTO: 0.1 X10E3/UL (ref 0–0.2)
BASOPHILS NFR BLD AUTO: 1 %
BILIRUB SERPL-MCNC: 0.4 MG/DL (ref 0–1.2)
BUN SERPL-MCNC: 20 MG/DL (ref 10–36)
BUN/CREAT SERPL: 21 (ref 12–28)
CALCIUM SERPL-MCNC: 9.4 MG/DL (ref 8.7–10.3)
CHLORIDE SERPL-SCNC: 98 MMOL/L (ref 96–106)
CO2 SERPL-SCNC: 25 MMOL/L (ref 18–29)
CREAT SERPL-MCNC: 0.94 MG/DL (ref 0.57–1)
EOSINOPHIL # BLD AUTO: 0 X10E3/UL (ref 0–0.4)
EOSINOPHIL NFR BLD AUTO: 1 %
ERYTHROCYTE [DISTWIDTH] IN BLOOD BY AUTOMATED COUNT: 23.1 % (ref 12.3–15.4)
FERRITIN SERPL-MCNC: 45 NG/ML (ref 15–150)
GFR SERPLBLD CREATININE-BSD FMLA CKD-EPI: 54 ML/MIN/{1.73_M2}
GFR SERPLBLD CREATININE-BSD FMLA CKD-EPI: 62 ML/MIN/{1.73_M2}
GLOBULIN SER CALC-MCNC: 2.7 G/L (ref 1.5–4.5)
GLUCOSE SERPL-MCNC: 76 MG/DL (ref 65–99)
HCT VFR BLD AUTO: 35.2 % (ref 34–46.6)
HGB BLD-MCNC: 11.1 G/DL (ref 11.1–15.9)
IMM GRANULOCYTES # BLD: 0 X10E3/UL (ref 0–0.1)
IMM GRANULOCYTES NFR BLD: 1 %
IRON SATN MFR SERPL: 20 % (ref 15–55)
IRON SERPL-MCNC: 67 UG/DL (ref 27–139)
LYMPHOCYTES # BLD AUTO: 0.9 X10E3/UL (ref 0.7–3.1)
LYMPHOCYTES NFR BLD AUTO: 23 %
MCH RBC QN AUTO: 26.8 PG (ref 26.6–33)
MCHC RBC AUTO-ENTMCNC: 31.5 G/DL (ref 31.5–35.7)
MCV RBC AUTO: 85 FL (ref 79–97)
MONOCYTES # BLD AUTO: 0.4 X10E3/UL (ref 0.1–0.9)
MONOCYTES NFR BLD AUTO: 11 %
MORPHOLOGY BLD-IMP: ABNORMAL
NEUTROPHILS # BLD AUTO: 2.5 X10E3/UL (ref 1.4–7)
NEUTROPHILS NFR BLD AUTO: 63 %
PLATELET # BLD AUTO: 314 X10E3/UL (ref 150–379)
POTASSIUM SERPL-SCNC: 4.5 MMOL/L (ref 3.5–5.2)
PROT SERPL-MCNC: 7.1 G/DL (ref 6–8.5)
RBC # BLD AUTO: 4.14 X10E6/UL (ref 3.77–5.28)
SODIUM SERPL-SCNC: 138 MMOL/L (ref 134–144)
T4 FREE SERPL-MCNC: 0.92 NG/DL (ref 0.82–1.77)
TIBC SERPL-MCNC: 330 UG/DL (ref 250–450)
TSH SERPL DL<=0.005 MIU/L-ACNC: 2.58 UIU/ML (ref 0.45–4.5)
UIBC SERPL-MCNC: 263 UG/DL (ref 118–369)
WBC # BLD AUTO: 3.9 X10E3/UL (ref 3.4–10.8)

## 2018-03-13 ENCOUNTER — HOSPITAL ENCOUNTER (OUTPATIENT)
Dept: LAB | Age: 83
Discharge: HOME | End: 2018-03-13
Payer: MEDICARE

## 2018-03-13 PROCEDURE — 87186 SC STD MICRODIL/AGAR DIL: CPT

## 2018-03-13 PROCEDURE — 81001 URINALYSIS AUTO W/SCOPE: CPT

## 2018-03-13 PROCEDURE — 87086 URINE CULTURE/COLONY COUNT: CPT

## 2018-03-16 LAB
APPEARANCE UR: ABNORMAL
BACTERIA #/AREA URNS HPF: ABNORMAL /[HPF]
BACTERIA UR CULT: ABNORMAL
BILIRUB UR QL STRIP: NEGATIVE
CASTS URNS QL MICRO: ABNORMAL /LPF
COLOR UR: YELLOW
EPI CELLS #/AREA URNS HPF: ABNORMAL /HPF
GLUCOSE UR QL: NEGATIVE
HGB UR QL STRIP: NEGATIVE
KETONES UR QL STRIP: NEGATIVE
LEUKOCYTE ESTERASE UR QL STRIP: ABNORMAL
MICRO URNS: ABNORMAL
NITRITE UR QL STRIP: POSITIVE
PH UR STRIP: 8 [PH] (ref 5–7.5)
PROT UR QL STRIP: NEGATIVE
RBC #/AREA URNS HPF: ABNORMAL /HPF
SP GR UR: 1.01 (ref 1–1.03)
URINALYSIS REFLEX , 377201: ABNORMAL
UROBILINOGEN UR STRIP-MCNC: 0.2 MG/DL (ref 0.2–1)
WBC #/AREA URNS HPF: ABNORMAL /HPF

## 2018-03-16 NOTE — PROGRESS NOTES
Patient has been informed per drs result notes and recs' , pt states is not currently having any urinary symptoms , faxed to Dr Bradford Tran fax # [phone]

## 2018-03-16 NOTE — PROGRESS NOTES
Please forward results of UA to her urologist.  Dr. Eric Olivera. Check with patient to see if she is having symptoms if she is she needs to contact this office as soon as possible    Office note  History of recurrent UTIs closely followed by urology.   He urine sample was to be done at urology office but she could not produce a sample and she could not have find the order form from the urologist.

## 2018-03-23 DIAGNOSIS — F41.9 ANXIETY: ICD-10-CM

## 2018-03-26 RX ORDER — LORAZEPAM 1 MG/1
TABLET ORAL
Qty: 180 TAB | Refills: 0 | OUTPATIENT
Start: 2018-03-26 | End: 2018-06-25 | Stop reason: SDUPTHER

## 2018-03-27 ENCOUNTER — TELEPHONE (OUTPATIENT)
Dept: INTERNAL MEDICINE CLINIC | Age: 83
End: 2018-03-27

## 2018-04-18 ENCOUNTER — OFFICE VISIT (OUTPATIENT)
Dept: INTERNAL MEDICINE CLINIC | Age: 83
End: 2018-04-18

## 2018-04-18 VITALS
HEIGHT: 59 IN | TEMPERATURE: 97.5 F | BODY MASS INDEX: 19.76 KG/M2 | SYSTOLIC BLOOD PRESSURE: 136 MMHG | DIASTOLIC BLOOD PRESSURE: 52 MMHG | OXYGEN SATURATION: 98 % | RESPIRATION RATE: 16 BRPM | HEART RATE: 87 BPM | WEIGHT: 98 LBS

## 2018-04-18 DIAGNOSIS — R44.0 AUDITORY HALLUCINATION: Primary | ICD-10-CM

## 2018-04-18 DIAGNOSIS — S89.92XD INJURY OF LEFT KNEE, SUBSEQUENT ENCOUNTER: ICD-10-CM

## 2018-04-18 DIAGNOSIS — E03.9 ACQUIRED HYPOTHYROIDISM: ICD-10-CM

## 2018-04-18 DIAGNOSIS — R44.1 VISUAL HALLUCINATION: ICD-10-CM

## 2018-04-18 DIAGNOSIS — M25.562 CHRONIC PAIN OF LEFT KNEE: ICD-10-CM

## 2018-04-18 DIAGNOSIS — F41.9 ANXIETY: ICD-10-CM

## 2018-04-18 DIAGNOSIS — G89.29 CHRONIC PAIN OF LEFT KNEE: ICD-10-CM

## 2018-04-18 NOTE — MR AVS SNAPSHOT
[address] [facility] [address] [facility] 57 
[phone] Patient: Ruba Haynes MRN: [id number] Visit Information Date & Time Provider Department Dept. Phone Encounter #  
 2018  1:30 PM Latina Peabody, MD [address] Internal Medicine [phone] [id number] Follow-up Instructions Return if symptoms worsen or fail to improve before scheduled appointment. Your Appointments 2018  1:00 PM  
New Patient with Clifton Henderson MD  
[facility] at [address]) Appt Note: n/p hallucinations 2018 [address] [place] [address]  
[phone]  
  
   
 [address] [facility] [address]  
  
    
 2018  1:15 PM  
ROUTINE CARE with Latina Peabody, MD  
[address] Internal Medicine [address] [place] [address]) Appt Note: concerned about Baxter Syndrome; .; .  
 [address] [facility] [address] [facility] [address]  
[id number] [address] Upcoming Health Maintenance Date Due  
 MEDICARE YEARLY EXAM 3/14/2018 GLAUCOMA SCREENING Q2Y 2019 DTaP/Tdap/Td series (2 - Td) 2/3/2024 Allergies as of 2018  Review Complete On: 2018 By: Latina Peabody, MD  
  
 Severity Noted Reaction Type Reactions Ciprofibrate  2015    Nausea Only Codeine  2013    Nausea Only Erythromycin  2013    Nausea Only Tetracycline  2013    Nausea Only Tramadol  2013    Other (comments) \"drunk\" Current Immunizations  Reviewed on 3/25/2013 Name Date Influenza Vaccine 2012 Pneumococcal Polysaccharide (PPSV-23) 3/27/2013  4:46 PM  
  
 Not reviewed this visit You Were Diagnosed With   
  
 Codes Comments Auditory hallucination    -  Primary ICD-10-CM: R44.0 ICD-9-CM: 780.1 Visual hallucination     ICD-10-CM: R44.1 ICD-9-CM: 368.16 Anxiety     ICD-10-CM: F41.9 ICD-9-CM: 300.00 Acquired hypothyroidism     ICD-10-CM: E03.9 ICD-9-CM: 452. 9 Chronic pain of left knee     ICD-10-CM: M25.562, G89.29 ICD-9-CM: 719.46, 338.29 Injury of left knee, subsequent encounter     ICD-10-CM: S89. [address] ICD-9-CM: V58.89, 959.7 Vitals BP Pulse Temp Resp Height(growth percentile) Weight(growth percentile) 136/52 (BP 1 Location: Left arm, BP Patient Position: Sitting) 87 97.5 °F (36.4 °C) (Oral) 16 4' 11\" (1.499 m) 98 lb (44.5 kg) SpO2 BMI OB Status Smoking Status 98% 19.79 kg/m2 Hysterectomy Never Smoker Vitals History BMI and BSA Data Body Mass Index Body Surface Area 19.79 kg/m 2 1.36 m 2 Preferred Pharmacy Pharmacy Name Phone [facility], [phone] [phone] Your Updated Medication List  
  
   
This list is accurate as of 4/18/18  2:44 PM.  Always use your most recent med list.  
  
  
  
  
 acetaminophen 650 mg Tab Take 650 mg by mouth every six (6) hours. amitriptyline 10 mg tablet Commonly known as:  ELAVIL TAKE 1 TABLET TWICE A DAY  
  
 aspirin delayed-release 81 mg tablet Take 81 mg by mouth daily. AZOPT 1 % ophthalmic suspension Generic drug:  brinzolamide Administer 1 Drop to left eye two (2) times a day. CENTRUM SILVER Tab tablet Generic drug:  multivitamins-minerals-lutein Take 1 Tab by mouth daily. ferrous sulfate 142 mg (45 mg iron) ER tablet Commonly known as:  SLOW FE Take 1 Tab by mouth Daily (before breakfast). ipratropium 42 mcg (0.06 %) nasal spray Commonly known as:  ATROVENT  
2 Sprays by Both Nostrils route two (2) times a day. levothyroxine 50 mcg tablet Commonly known as:  SYNTHROID  
TAKE ONE-HALF (1/2) TABLET DAILY BEFORE BREAKFAST LORazepam 1 mg tablet Commonly known as:  ATIVAN  
take 1 tablet by mouth every 12 hours if needed for anxiety  
  
 polyethylene glycol 17 gram/dose powder Commonly known as:  Manual Rouleau Take 17 g by mouth daily. raNITIdine 150 mg tablet Commonly known as:  ZANTAC Take 150 mg by mouth daily. senna-docusate 8.6-50 mg per tablet Commonly known as:  Everrett Hickory Take 1 Tab by mouth two (2) times a day. simvastatin 10 mg tablet Commonly known as:  ZOCOR Take 10 mg by mouth nightly. We Performed the Following REFERRAL TO NEUROLOGY [JRA25 Custom] Follow-up Instructions Return if symptoms worsen or fail to improve before scheduled appointment. To-Do List   
 04/18/2018 Imaging:  CT HEAD WO CONT Referral Information Referral ID Referred By Referred To  
  
 [id number] Apple Horner, [address], [place]   
   [address] [place] [address] [address] [place], [address] Phone: [phone] Fax: [phone] Visits Status Start Date End Date 1 Authorized 4/18/18 4/18/19 If your referral has a status of pending review or denied, additional information will be sent to support the outcome of this decision. Patient Instructions It was a pleasure to see you! As discussed: 
 
Knee Pain It may take 4-6 weeks to resolve Use heat instead of ice. Return to Orthopedics Knee Pain or Injury: Care Instructions Your Care Instructions Injuries are a common cause of knee problems. Sudden (acute) injuries may be caused by a direct blow to the knee. They can also be caused by abnormal twisting, bending, or falling on the knee. Pain, bruising, or swelling may be severe, and may start within minutes of the injury. Overuse is another cause of knee pain. Other causes are climbing stairs, kneeling, and other activities that use the knee. Everyday wear and tear, especially as you get older, also can cause knee pain. Rest, along with home treatment, often relieves pain and allows your knee to heal. If you have a serious knee injury, you may need tests and treatment. Follow-up care is a key part of your treatment and safety. Be sure to make and go to all appointments, and call your doctor if you are having problems. It's also a good idea to know your test results and keep a list of the medicines you take. How can you care for yourself at home? · Be safe with medicines. Read and follow all instructions on the label. ¨ If the doctor gave you a prescription medicine for pain, take it as prescribed. ¨ If you are not taking a prescription pain medicine, ask your doctor if you can take an over-the-counter medicine. · Rest and protect your knee. Take a break from any activity that may cause pain. · Put ice or a cold pack on your knee for 10 to 20 minutes at a time. Put a thin cloth between the ice and your skin. · Prop up a sore knee on a pillow when you ice it or anytime you sit or lie down for the next 3 days. Try to keep it above the level of your heart. This will help reduce swelling. · If your knee is not swollen, you can put moist heat, a heating pad, or a warm cloth on your knee. · If your doctor recommends an elastic bandage, sleeve, or other type of support for your knee, wear it as directed. · Follow your doctor's instructions about how much weight you can put on your leg. Use a cane, crutches, or a walker as instructed. · Follow your doctor's instructions about activity during your healing process. If you can do mild exercise, slowly increase your activity. · Reach and stay at a healthy weight. Extra weight can strain the joints, especially the knees and hips, and make the pain worse. Losing even a few pounds may help. When should you call for help? Call 911 anytime you think you may need emergency care.  For example, call if: 
? · You have symptoms of a blood clot in your lung (called a pulmonary embolism). These may include: 
¨ Sudden chest pain. ¨ Trouble breathing. ¨ Coughing up blood. ?Call your doctor now or seek immediate medical care if: 
? · You have severe or increasing pain. ? · Your leg or foot turns cold or changes color. ? · You cannot stand or put weight on your knee. ? · Your knee looks twisted or bent out of shape. ? · You cannot move your knee. ? · You have signs of infection, such as: 
¨ Increased pain, swelling, warmth, or redness. ¨ Red streaks leading from the knee. ¨ Pus draining from a place on your knee. ¨ A fever. ? · You have signs of a blood clot in your leg (called a deep vein thrombosis), such as: 
¨ Pain in your calf, back of the knee, thigh, or groin. ¨ Redness and swelling in your leg or groin. ? Watch closely for changes in your health, and be sure to contact your doctor if: 
? · You have tingling, weakness, or numbness in your knee. ? · You have any new symptoms, such as swelling. ? · You have bruises from a knee injury that last longer than 2 weeks. ? · You do not get better as expected. Where can you learn more? Go to http://nikolay-kendra.info/. Enter K195 in the search box to learn more about \"Knee Pain or Injury: Care Instructions. \" Current as of: March 20, 2017 Content Version: 11.4 © 3168-4256 [employer]. Care instructions adapted under license by [employer] (which disclaims liability or warranty for this information). If you have questions about a medical condition or this instruction, always ask your healthcare professional. [place] [address] any warranty or liability for your use of this information. Introducing \[facility] [place] [facility]\"" & HEALTH SERVICES! [facility] introduces [employer] patient portal. Now you can access parts of your medical record, email your doctor's office, and request medication refills online.    
 
1. In your internet browser, go to [email or web address] 2. Click on the First Time User? Click Here link in the Sign In box. You will see the New Member Sign Up page. 3. Enter your Sift Access Code exactly as it appears below. You will not need to use this code after youve completed the sign-up process. If you do not sign up before the expiration date, you must request a new code. · Sift Access Code: [email or web address] Expires: 5/22/2018  9:59 AM 
 
4. Enter the last four digits of your Social Security Number (xxxx) and Date of Birth (mm/dd/yyyy) as indicated and click Submit. You will be taken to the next sign-up page. 5. Create a Scifinitit ID. This will be your Sift login ID and cannot be changed, so think of one that is secure and easy to remember. 6. Create a Sift password. You can change your password at any time. 7. Enter your Password Reset Question and Answer. This can be used at a later time if you forget your password. 8. Enter your e-mail address. You will receive e-mail notification when new information is available in [address] 9. Click Sign Up. You can now view and download portions of your medical record. 10. Click the Download Summary menu link to download a portable copy of your medical information. If you have questions, please visit the Frequently Asked Questions section of the Sift website. Remember, Sift is NOT to be used for urgent needs. For medical emergencies, dial 911. Now available from your iPhone and Android! Please provide this summary of care documentation to your next provider. Your primary care clinician is listed as Gregg Rice. If you have any questions after today's visit, please call [phone].

## 2018-04-18 NOTE — PROGRESS NOTES
HISTORY OF PRESENT ILLNESS  Blanka Naranjo is a 80 y.o. female. Joint Pain    The current episode started more than 1 week ago (4/6/18). The problem has been gradually improving. The pain is present in the left knee. The quality of the pain is described as aching. The pain is at a severity of 7/10. The pain is moderate. Associated symptoms include stiffness. Pertinent negatives include no numbness. The symptoms are aggravated by cold. Hypothyroidism  Patient is seen for followup of hypothyroidism. Thyroid ROS: denies fatigue, weight changes, heat/cold intolerance, bowel/skin changes or CVS symptoms. Mental Health Review  Ms. Carlo Kaiser has a history of anxiety she has been on Lorazepam and amitriptyline for many years, greater than 25 years. A previous attempt to wean the Lorazepam was made but she had severe withdrawal symptoms that included a mechanical fall. .  She is accompanied today by her daughter and stepson due to concern that she is experiencing auditory and visual hallucinations. Her daughter reports that for about 1 year Ms. Carlo Kaiser has been hearing a \"radio program\"  at night at her home and when she visits her daughter. Most recently in the past month the skeleton reports that 2 young gentleman have been visiting her apartment to watch television. She does not have a camera in her room to visualize recordings. But per her daughter this visitation seems improbable. Review of Systems   Musculoskeletal: Positive for joint pain and stiffness. Neurological: Negative for numbness. All other systems reviewed and are negative.   Per HPI  Patient Active Problem List    Diagnosis Date Noted    Acquired hypothyroidism 10/05/2017    Paroxysmal A-fib ([address] 09/17/2014    Glaucoma 09/17/2014    Hypercholesterolemia 09/17/2014    Anxiety 09/17/2014    Gas 09/17/2014    DJD (degenerative joint disease) of knee 03/26/2013       Current Outpatient Prescriptions   Medication Sig Dispense Refill  LORazepam (ATIVAN) 1 mg tablet take 1 tablet by mouth every 12 hours if needed for anxiety 180 Tab 0    polyethylene glycol (MIRALAX) 17 gram/dose powder Take 17 g by mouth daily. 537 g 2    amitriptyline (ELAVIL) 10 mg tablet TAKE 1 TABLET TWICE A  Tab 3    ferrous sulfate (SLOW FE) 142 mg (45 mg iron) ER tablet Take 1 Tab by mouth Daily (before breakfast). 90 Tab 1    cephALEXin (KEFLEX) 250 mg capsule Take 2 Caps by mouth daily.  levothyroxine (SYNTHROID) 50 mcg tablet TAKE ONE-HALF (1/2) TABLET DAILY BEFORE BREAKFAST 45 Tab 4    aspirin delayed-release 81 mg tablet Take 81 mg by mouth daily.  ranitidine (ZANTAC) 150 mg tablet Take 150 mg by mouth daily.  acetaminophen 650 mg Tab Take 650 mg by mouth every six (6) hours. (Patient taking differently: Take 650 mg by mouth every six (6) hours as needed.) 1 Tab 0    senna-docusate (PERICOLACE) 8.6-50 mg per tablet Take 1 Tab by mouth two (2) times a day. (Patient taking differently: Take 1 Tab by mouth as needed.) 60 Tab 0    ipratropium (ATROVENT) 0.06 % nasal spray 2 Sprays by Both Nostrils route two (2) times a day.  brinzolamide (AZOPT) 1 % ophthalmic suspension Administer 1 Drop to left eye two (2) times a day.  simvastatin (ZOCOR) 10 mg tablet Take 10 mg by mouth nightly.  multivitamins-minerals-lutein (CENTRUM SILVER) Tab Take 1 Tab by mouth daily. Allergies   Allergen Reactions    Ciprofibrate Nausea Only    Codeine Nausea Only    Erythromycin Nausea Only    Tetracycline Nausea Only    Tramadol Other (comments)     \"drunk\"      Visit Vitals    /52 (BP 1 Location: Left arm, BP Patient Position: Sitting)    Pulse 87    Temp 97.5 °F (36.4 °C) (Oral)    Resp 16    Ht 4' 11\" (1.499 m)    Wt 98 lb (44.5 kg)    SpO2 98%    BMI 19.79 kg/m2       Physical Exam   Constitutional: She is oriented to person, place, and time. She appears well-developed. No distress.    Eyes: Conjunctivae are normal. Neck: Neck supple. No thyromegaly present. Cardiovascular: Normal rate, regular rhythm and normal heart sounds. Pulmonary/Chest: Effort normal and breath sounds normal. No respiratory distress. She has no wheezes. She has no rales. She exhibits no tenderness. Lymphadenopathy:     She has no cervical adenopathy. Neurological: She is alert and oriented to person, place, and time. Skin: Skin is warm. Psychiatric: She has a normal mood and affect. Lab Results  Component Value Date/Time   WBC 3.9 02/21/2018 09:11 AM   HGB 11.1 02/21/2018 09:11 AM   HCT 35.2 02/21/2018 09:11 AM   PLATELET 471 64/97/0220 09:11 AM   MCV 85 02/21/2018 09:11 AM     Lab Results  Component Value Date/Time   Hemoglobin A1c 5.3 03/12/2013 08:39 AM   Glucose 76 02/21/2018 09:11 AM   Glucose (POC) 79 03/25/2013 08:40 AM   LDL, calculated 90 02/21/2017 10:36 AM   Creatinine 0.94 02/21/2018 09:11 AM      Lab Results  Component Value Date/Time   Cholesterol, total 163 02/21/2017 10:36 AM   HDL Cholesterol 55 02/21/2017 10:36 AM   LDL, calculated 90 02/21/2017 10:36 AM   Triglyceride 90 02/21/2017 10:36 AM     Lab Results  Component Value Date/Time   ALT (SGPT) 15 02/21/2018 09:11 AM   AST (SGOT) 25 02/21/2018 09:11 AM   Alk.  phosphatase 57 02/21/2018 09:11 AM   Bilirubin, total 0.4 02/21/2018 09:11 AM   Albumin 4.4 02/21/2018 09:11 AM   Protein, total 7.1 02/21/2018 09:11 AM   INR 2.9 (H) 03/29/2013 03:30 AM   Prothrombin time 29.5 (H) 03/29/2013 03:30 AM   PLATELET 209 59/67/4135 09:11 AM       Lab Results  Component Value Date/Time   GFR est non-AA 54 (L) 02/21/2018 09:11 AM   GFR est AA 62 02/21/2018 09:11 AM   Creatinine 0.94 02/21/2018 09:11 AM   BUN 20 02/21/2018 09:11 AM   Sodium 138 02/21/2018 09:11 AM   Potassium 4.5 02/21/2018 09:11 AM   Chloride 98 02/21/2018 09:11 AM   CO2 25 02/21/2018 09:11 AM     Lab Results  Component Value Date/Time   TSH 2.580 02/21/2018 09:11 AM   T4, Free 0.92 02/21/2018 09:11 AM   T4, Total 7.0 08/27/2015 09:20 AM      Lab Results   Component Value Date/Time    Glucose 76 02/21/2018 09:11 AM    Glucose (POC) 79 03/25/2013 08:40 AM         ASSESSMENT and PLAN  Diagnoses and all orders for this visit:    1. Auditory hallucination patient's family presents today with concerns that she is having auditory and possibly visual hallucinations. She has multiple risk factors that could contribute to her change in mentation specifically she is on benzodiazepines, we have tried to stop this in the past but she had severe withdrawal effects. For now continue lorazepam until she is evaluated by neurology in the interim will complete the medical workup starting with a head CT. She has recent TFTs which were within normal limits she will likely need a B12 and RPR level. What we will hold off on this until she sees neurology in case that are other labs that need to be ordered. She is not distressed by these hallucinations her family will work on helping her to get a camera in her room to ascertain if she really is having visitors  She is otherwise cognitively intact outside of these possible hallucinations. Additionally is worth mentioning that she has a history of recurrent UTI and bacteremia most recent episode was in March 2018 which may be contributing to her symptoms as well  -     REFERRAL TO NEUROLOGY  -     CT HEAD WO CONT; Future    2. Visual hallucinationsee above  -     REFERRAL TO NEUROLOGY  -     CT HEAD WO CONT; Future    3. Anxietystable. Continue current regimen. See above  -     REFERRAL TO NEUROLOGY    4. Acquired hypothyroidismrecent TFTs at goal.    5. Chronic pain of left knee no evidence of acute infection. Mild effusion. Expectant management reviewed. Advised to return to orthopedics if symptoms are not improving within the next 4 weeks.     6. Injury of left knee, subsequent encounter      Follow-up Disposition:  Return if symptoms worsen or fail to improve before scheduled appointment. Medication risks/benefits/costs/interactions/alternatives discussed with patient. Carmelita Rivera  was given an after visit summary which includes diagnoses, current medications, & vitals. she expressed understanding with the diagnosis and plan.

## 2018-04-18 NOTE — PATIENT INSTRUCTIONS
It was a pleasure to see you! As discussed:    Knee Pain   It may take 4-6 weeks to resolve  Use heat instead of ice. Return to Orthopedics              Knee Pain or Injury: Care Instructions  Your Care Instructions    Injuries are a common cause of knee problems. Sudden (acute) injuries may be caused by a direct blow to the knee. They can also be caused by abnormal twisting, bending, or falling on the knee. Pain, bruising, or swelling may be severe, and may start within minutes of the injury. Overuse is another cause of knee pain. Other causes are climbing stairs, kneeling, and other activities that use the knee. Everyday wear and tear, especially as you get older, also can cause knee pain. Rest, along with home treatment, often relieves pain and allows your knee to heal. If you have a serious knee injury, you may need tests and treatment. Follow-up care is a key part of your treatment and safety. Be sure to make and go to all appointments, and call your doctor if you are having problems. It's also a good idea to know your test results and keep a list of the medicines you take. How can you care for yourself at home? · Be safe with medicines. Read and follow all instructions on the label. ¨ If the doctor gave you a prescription medicine for pain, take it as prescribed. ¨ If you are not taking a prescription pain medicine, ask your doctor if you can take an over-the-counter medicine. · Rest and protect your knee. Take a break from any activity that may cause pain. · Put ice or a cold pack on your knee for 10 to 20 minutes at a time. Put a thin cloth between the ice and your skin. · Prop up a sore knee on a pillow when you ice it or anytime you sit or lie down for the next 3 days. Try to keep it above the level of your heart. This will help reduce swelling. · If your knee is not swollen, you can put moist heat, a heating pad, or a warm cloth on your knee.   · If your doctor recommends an elastic bandage, sleeve, or other type of support for your knee, wear it as directed. · Follow your doctor's instructions about how much weight you can put on your leg. Use a cane, crutches, or a walker as instructed. · Follow your doctor's instructions about activity during your healing process. If you can do mild exercise, slowly increase your activity. · Reach and stay at a healthy weight. Extra weight can strain the joints, especially the knees and hips, and make the pain worse. Losing even a few pounds may help. When should you call for help? Call 911 anytime you think you may need emergency care. For example, call if:  ? · You have symptoms of a blood clot in your lung (called a pulmonary embolism). These may include:  ¨ Sudden chest pain. ¨ Trouble breathing. ¨ Coughing up blood. ?Call your doctor now or seek immediate medical care if:  ? · You have severe or increasing pain. ? · Your leg or foot turns cold or changes color. ? · You cannot stand or put weight on your knee. ? · Your knee looks twisted or bent out of shape. ? · You cannot move your knee. ? · You have signs of infection, such as:  ¨ Increased pain, swelling, warmth, or redness. ¨ Red streaks leading from the knee. ¨ Pus draining from a place on your knee. ¨ A fever. ? · You have signs of a blood clot in your leg (called a deep vein thrombosis), such as:  ¨ Pain in your calf, back of the knee, thigh, or groin. ¨ Redness and swelling in your leg or groin. ? Watch closely for changes in your health, and be sure to contact your doctor if:  ? · You have tingling, weakness, or numbness in your knee. ? · You have any new symptoms, such as swelling. ? · You have bruises from a knee injury that last longer than 2 weeks. ? · You do not get better as expected. Where can you learn more? Go to http://nikolay-kendra.info/. Enter K195 in the search box to learn more about \"Knee Pain or Injury: Care Instructions. \"  Current as of: March 20, 2017  Content Version: 11.4  © 7027-9656 [employer]. Care instructions adapted under license by [employer] (which disclaims liability or warranty for this information). If you have questions about a medical condition or this instruction, always ask your healthcare professional. [address] any warranty or liability for your use of this information.

## 2018-04-18 NOTE — PROGRESS NOTES
Chief Complaint   Patient presents with    Cholesterol Problem     1. Have you been to the ER, urgent care clinic since your last visit? Hospitalized since your last visit? No    2. Have you seen or consulted any other health care providers outside of the [address] [place] [address] since your last visit? Include any pap smears or colon screening.  Yes Where: orthopedic Reason for visit: left knee

## 2018-05-01 ENCOUNTER — HOSPITAL ENCOUNTER (OUTPATIENT)
Dept: CT IMAGING | Age: 83
Discharge: HOME | End: 2018-05-01
Attending: INTERNAL MEDICINE
Payer: MEDICARE

## 2018-05-01 DIAGNOSIS — R44.0 AUDITORY HALLUCINATION: ICD-10-CM

## 2018-05-01 DIAGNOSIS — R44.1 VISUAL HALLUCINATION: ICD-10-CM

## 2018-05-01 PROCEDURE — 70450 CT HEAD/BRAIN W/O DYE: CPT

## 2018-05-02 ENCOUNTER — TELEPHONE (OUTPATIENT)
Dept: INTERNAL MEDICINE CLINIC | Age: 83
End: 2018-05-02

## 2018-05-02 DIAGNOSIS — E23.6 PITUITARY MASS (HCC): Primary | ICD-10-CM

## 2018-05-02 NOTE — PROGRESS NOTES
Team 5 please call and inform patient that her CT scan was abnormal and additional testing is needed. An MRI and lab work has been ordered. Her son has been notified of the findings already.

## 2018-05-02 NOTE — TELEPHONE ENCOUNTER
Reviewed abnormal CT scan. Called patient's son Hemalatha Lei [place], and informed him of the findings. They would like to proceed with the MRI and hormonal testing. Will order the pituitary MRI as well as the appropriate hormone testing. She has an appointment with Dr. Rachel Navarrete based on the on May 15. He was also informed that based on the hormonal results she may need to see endocrinology as well. Will call the skeleton in the a.m. and notify her of the results and plan of care.

## 2018-05-03 ENCOUNTER — TELEPHONE (OUTPATIENT)
Dept: INTERNAL MEDICINE CLINIC | Age: 83
End: 2018-05-03

## 2018-05-03 DIAGNOSIS — E23.6 PITUITARY MASS (HCC): Primary | ICD-10-CM

## 2018-05-17 ENCOUNTER — TELEPHONE (OUTPATIENT)
Dept: INTERNAL MEDICINE CLINIC | Age: 83
End: 2018-05-17

## 2018-05-17 NOTE — TELEPHONE ENCOUNTER
Rosemary Fernandez (Self) [phone]      Pt would like to come in next week to have labs done for her appt on 6/4/18.

## 2018-06-04 ENCOUNTER — OFFICE VISIT (OUTPATIENT)
Dept: INTERNAL MEDICINE CLINIC | Age: 83
End: 2018-06-04

## 2018-06-04 ENCOUNTER — HOSPITAL ENCOUNTER (OUTPATIENT)
Dept: LAB | Age: 83
Discharge: HOME | End: 2018-06-04
Payer: MEDICARE

## 2018-06-04 VITALS
RESPIRATION RATE: 16 BRPM | DIASTOLIC BLOOD PRESSURE: 66 MMHG | OXYGEN SATURATION: 98 % | HEART RATE: 89 BPM | SYSTOLIC BLOOD PRESSURE: 150 MMHG | BODY MASS INDEX: 19.8 KG/M2 | TEMPERATURE: 97.8 F | WEIGHT: 98.2 LBS | HEIGHT: 59 IN

## 2018-06-04 DIAGNOSIS — M17.4 OTHER SECONDARY OSTEOARTHRITIS OF BOTH KNEES: ICD-10-CM

## 2018-06-04 DIAGNOSIS — E03.9 ACQUIRED HYPOTHYROIDISM: ICD-10-CM

## 2018-06-04 DIAGNOSIS — Z00.00 MEDICARE ANNUAL WELLNESS VISIT, SUBSEQUENT: Primary | ICD-10-CM

## 2018-06-04 DIAGNOSIS — I48.0 PAROXYSMAL A-FIB (HCC): ICD-10-CM

## 2018-06-04 DIAGNOSIS — E78.00 HYPERCHOLESTEROLEMIA: ICD-10-CM

## 2018-06-04 DIAGNOSIS — N39.0 RECURRENT UTI: ICD-10-CM

## 2018-06-04 DIAGNOSIS — F41.9 ANXIETY: ICD-10-CM

## 2018-06-04 DIAGNOSIS — E23.6 PITUITARY MASS (HCC): ICD-10-CM

## 2018-06-04 PROCEDURE — 82024 ASSAY OF ACTH: CPT

## 2018-06-04 PROCEDURE — 82533 TOTAL CORTISOL: CPT

## 2018-06-04 PROCEDURE — 84146 ASSAY OF PROLACTIN: CPT

## 2018-06-04 PROCEDURE — 84305 ASSAY OF SOMATOMEDIN: CPT

## 2018-06-04 PROCEDURE — 36415 COLL VENOUS BLD VENIPUNCTURE: CPT

## 2018-06-04 RX ORDER — ACETAMINOPHEN 500 MG
500 TABLET ORAL
COMMUNITY

## 2018-06-04 NOTE — PROGRESS NOTES
Chief Complaint   Patient presents with    Knee Pain     left sided     1. Have you been to the ER, urgent care clinic since your last visit? Hospitalized since your last visit? No    2. Have you seen or consulted any other health care providers outside of the [address] [place] [address] since your last visit? Include any pap smears or colon screening.  Yes Where: Orthopedic Reason for visit: knee pain

## 2018-06-04 NOTE — PROGRESS NOTES
HISTORY OF PRESENT ILLNESS  Tanner Garsia is a 80 y.o. female. HPI  Pituitary Mass   Tanner Garsia presented with her daughter Marylu Saavedra on 4/18/18 with concern for visual and auditory hallucinations. Summer Homans was done that revealed a large pituitary mass 23 x 17. She declined wkup. Followed by Dr. Rajat Reddy at [facility]. Has an appointment in 3 weeks. Denies HA or vision changes    Knee Pain   Closely followed by Dr. Riley Garcia. She has been having intermittent knee swelling. Dr. Riley Garcia is evaluating for infection. Had a joint aspiration 6. 1. 18. This is the Subsequent Medicare Annual Wellness Exam, performed 12 months or more after the Initial AWV or the last Subsequent AWV    I have reviewed the patient's medical history in detail and updated the computerized patient record. History     Past Medical History:   Diagnosis Date    Arrhythmia     paroxysmal a-fib    Arthritis     Asthma     GERD (gastroesophageal reflux disease)     Glaucoma     Nausea & vomiting     Other ill-defined conditions(799.89)     vertigo    Other ill-defined conditions(799.89)     elevated cholesterol    Psychiatric disorder     anxiety    PUD (peptic ulcer disease)     yrs ago    Scoliosis     Thyroid disease     hypothyroid    Ulcer disease 9/17/2014    Many years ago. No surgery needed      Unspecified adverse effect of anesthesia     nausea      Past Surgical History:   Procedure Laterality Date    HX CATARACT REMOVAL Bilateral     HX DILATION AND CURETTAGE      HX HYSTERECTOMY      HX ORTHOPAEDIC  3/25/13    LEFT TOTAL KNEE REPLACEMENT    HX OTHER SURGICAL      eye brow lift     Current Outpatient Prescriptions   Medication Sig Dispense Refill    acetaminophen (TYLENOL EXTRA STRENGTH) 500 mg tablet Take 500 mg by mouth every four (4) hours as needed for Pain.       LORazepam (ATIVAN) 1 mg tablet take 1 tablet by mouth every 12 hours if needed for anxiety 180 Tab 0    amitriptyline (ELAVIL) 10 mg tablet TAKE 1 TABLET TWICE A  Tab 3    ferrous sulfate (SLOW FE) 142 mg (45 mg iron) ER tablet Take 1 Tab by mouth Daily (before breakfast). 90 Tab 1    levothyroxine (SYNTHROID) 50 mcg tablet TAKE ONE-HALF (1/2) TABLET DAILY BEFORE BREAKFAST 45 Tab 4    aspirin delayed-release 81 mg tablet Take 81 mg by mouth daily.  ranitidine (ZANTAC) 150 mg tablet Take 150 mg by mouth daily.  senna-docusate (PERICOLACE) 8.6-50 mg per tablet Take 1 Tab by mouth two (2) times a day. (Patient taking differently: Take 1 Tab by mouth as needed.) 60 Tab 0    ipratropium (ATROVENT) 0.06 % nasal spray 2 Sprays by Both Nostrils route two (2) times a day.  brinzolamide (AZOPT) 1 % ophthalmic suspension Administer 1 Drop to left eye two (2) times a day.  simvastatin (ZOCOR) 10 mg tablet Take 10 mg by mouth nightly.  multivitamins-minerals-lutein (CENTRUM SILVER) Tab Take 1 Tab by mouth daily.  polyethylene glycol (MIRALAX) 17 gram/dose powder Take 17 g by mouth daily. 537 g 2    acetaminophen 650 mg Tab Take 650 mg by mouth every six (6) hours.  (Patient taking differently: Take 650 mg by mouth every six (6) hours as needed.) 1 Tab 0     Allergies   Allergen Reactions    Ciprofibrate Nausea Only    Codeine Nausea Only    Erythromycin Nausea Only    Tetracycline Nausea Only    Tramadol Other (comments)     \"drunk\"     Family History   Problem Relation Age of Onset    Stroke Mother     Heart Disease Father     Anesth Problems Neg Hx      Social History   Substance Use Topics    Smoking status: Never Smoker    Smokeless tobacco: Never Used    Alcohol use No     Patient Active Problem List   Diagnosis Code    DJD (degenerative joint disease) of knee M17.10    Paroxysmal A-fib (HCC) I48.0    Glaucoma H40.9    Hypercholesterolemia E78.00    Anxiety F41.9    Gas [id number]    Acquired hypothyroidism E03.9       Depression Risk Factor Screening:     PHQ over the last two weeks 6/4/2018   Little interest or pleasure in doing things Several days   Feeling down, depressed or hopeless Several days   Total Score PHQ 2 2     Alcohol Risk Factor Screening: You do not drink alcohol or very rarely. Functional Ability and Level of Safety:   Hearing Loss  The patient wears hearing aids. Activities of Daily Living  The home contains: + safety equipment. Patient does total self care    Fall Risk  Fall Risk Assessment, last 12 mths 6/4/2018   Able to walk? Yes   Fall in past 12 months? Yes   Fall with injury? No   Number of falls in past 12 months 1   Fall Risk Score 1       Abuse Screen  Patient is not abused    Cognitive Screening   Evaluation of Cognitive Function:  Has your family/caregiver stated any concerns about your memory: Yes- Didier Allen is concerned she can't recall names as readily. Her son is present and he does not have concerns about her memory. States concerns raised on 4/18/18       Patient Care Team   Patient Care Team:  Marina Roberts MD as PCP - General (Internal Medicine)      Review of Systems   Constitutional: Negative for chills and fever. Per HPI   Visit Vitals    /66 (BP 1 Location: Right arm, BP Patient Position: Sitting)    Pulse 89    Temp 97.8 °F (36.6 °C) (Oral)    Resp 16    Ht 4' 11\" (1.499 m)    Wt 98 lb 3.2 oz (44.5 kg)    SpO2 98%    BMI 19.83 kg/m2       Physical Exam   Constitutional: She is oriented to person, place, and time. She appears well-developed. No distress. Eyes: Conjunctivae are normal.   Neck: Neck supple. No thyromegaly present. Cardiovascular: Normal rate, regular rhythm and normal heart sounds. Pulmonary/Chest: Effort normal and breath sounds normal. No respiratory distress. She has no wheezes. She has no rales. She exhibits no tenderness. Musculoskeletal:        Left knee: She exhibits effusion. She exhibits no ecchymosis. Legs:  Lymphadenopathy:     She has no cervical adenopathy. Neurological: She is alert and oriented to person, place, and time. Skin: Skin is warm. Psychiatric: She has a normal mood and affect. Lab Results  Component Value Date/Time   WBC 3.9 02/21/2018 09:11 AM   HGB 11.1 02/21/2018 09:11 AM   HCT 35.2 02/21/2018 09:11 AM   PLATELET 514 89/65/2602 09:11 AM   MCV 85 02/21/2018 09:11 AM     Lab Results  Component Value Date/Time   Hemoglobin A1c 5.3 03/12/2013 08:39 AM   Glucose 76 02/21/2018 09:11 AM   Glucose (POC) 79 03/25/2013 08:40 AM   LDL, calculated 90 02/21/2017 10:36 AM   Creatinine 0.94 02/21/2018 09:11 AM      Lab Results  Component Value Date/Time   Cholesterol, total 163 02/21/2017 10:36 AM   HDL Cholesterol 55 02/21/2017 10:36 AM   LDL, calculated 90 02/21/2017 10:36 AM   Triglyceride 90 02/21/2017 10:36 AM     Lab Results  Component Value Date/Time   ALT (SGPT) 15 02/21/2018 09:11 AM   AST (SGOT) 25 02/21/2018 09:11 AM   Alk.  phosphatase 57 02/21/2018 09:11 AM   Bilirubin, total 0.4 02/21/2018 09:11 AM   Albumin 4.4 02/21/2018 09:11 AM   Protein, total 7.1 02/21/2018 09:11 AM   INR 2.9 (H) 03/29/2013 03:30 AM   Prothrombin time 29.5 (H) 03/29/2013 03:30 AM   PLATELET 517 48/10/1763 09:11 AM       Lab Results  Component Value Date/Time   GFR est non-AA 54 (L) 02/21/2018 09:11 AM   GFR est AA 62 02/21/2018 09:11 AM   Creatinine 0.94 02/21/2018 09:11 AM   BUN 20 02/21/2018 09:11 AM   Sodium 138 02/21/2018 09:11 AM   Potassium 4.5 02/21/2018 09:11 AM   Chloride 98 02/21/2018 09:11 AM   CO2 25 02/21/2018 09:11 AM     Lab Results  Component Value Date/Time   TSH 2.580 02/21/2018 09:11 AM   T4, Free 0.92 02/21/2018 09:11 AM   T4, Total 7.0 08/27/2015 09:20 AM      Lab Results   Component Value Date/Time    Glucose 76 02/21/2018 09:11 AM    Glucose (POC) 79 03/25/2013 08:40 AM         Status Provider Status      Final result (Exam End: 5/1/2018 12:51 PM) Reviewed      Study Result   EXAM:  CT HEAD WO CONT  Clinical history: Hallucinations  INDICATION: hallucinations     COMPARISON: None.     CONTRAST:  None.     TECHNIQUE: Unenhanced CT of the head was performed using 5 mm images. Brain and  bone windows were generated. CT dose reduction was achieved through use of a  standardized protocol tailored for this examination and automatic exposure  control for dose modulation.       FINDINGS:  Confluent periventricular and scattered hypodensities in the cerebral white  matter. . There is a large pituitary mass. . No intracranial hemorrhage. Sulcal  and ventricular prominence. .  The basilar cisterns are open. No acute infarct is  identified. The bone windows demonstrate no abnormalities. The visualized  portions of the paranasal sinuses and mastoid air cells are clear.     IMPRESSION  IMPRESSION:      Large pituitary region mass measuring 23 x 17 mm in size. 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 Full delineation with pituitary protocol MRI of the  brain is recommended.     Severe chronic microvascular ischemic change.     23X          ASSESSMENT and PLAN  Assessment/Plan   Education and counseling provided:  Are appropriate based on today's review and evaluation  End-of-Life planning (with patient's consent)    Diagnoses and all orders for this visit:    1. Medicare annual wellness visit, subsequent-- Health Maintenance reviewed and addressed as ordered below       2. Pituitary mass (HCC)-found on CT in April 2018. Initially Malou Mims  declined further workup and treatment. However today after lengthy discussion she is agreeable to at least completing her labs. Her abnormal she would then see the endocrinologist.  Her main concern was that invasive procedures would be necessary to treat the mass. Given that she is not having any headaches or vision changes I assured her that acutely surgery was not eminent depending upon what the findings were.   She has an upcoming appointment with her ophthalmologist.  I will contact him to ensure that he is aware of the mass and reports any visual defects that may be associated with her mass. Red flags to warrant ER or earlier clinical evaluation reviewed. 3. Recurrent UTIasymptomatic. Followed by urology. She asked to leave a urine sample today but was unable to provide one.  -     UA/M W/RFLX CULTURE, ROUTINE    4. Acquired hypothyroidismrecent TFTs at goal.    5. Paroxysmal A-fib (HCC)rate controlled. On aspirin. Followed by cardiology. 6. Anxiety-stable. Risk and benefits of long-term lorazepam have been reviewed and we continue to review intermittently. She is using medication as directed. 7. Hypercholesterolemiaon simvastatin. 8. Other secondary osteoarthritis of both knees closely followed by orthopedics. Advised to try Aspercreme to see if this brings her some relief in addition to her current pain regimen. Follow-up Disposition:  Return in about 3 months (around 9/4/2018) for Follow-up. Medication risks/benefits/costs/interactions/alternatives discussed with patient and son present. Didier Allen  was given an after visit summary which includes diagnoses, current medications, & vitals. she expressed understanding with the diagnosis and plan.         Health Maintenance Due   Topic Date Due    MEDICARE YEARLY EXAM  03/14/2018

## 2018-06-04 NOTE — MR AVS SNAPSHOT
[address] 
[phone] Patient: Leann Ling MRN: [id number] :[id number] Visit Information Date & Time Provider Department Dept. Phone Encounter #  
 2018  1:15 PM Johann Parham MD [facility] Internal Medicine [phone] [id number] Follow-up Instructions Return in about 3 months (around 2018) for Follow-up. Upcoming Health Maintenance Date Due  
 MEDICARE YEARLY EXAM 3/14/2018 Influenza Age 5 to Adult 2018 GLAUCOMA SCREENING Q2Y 2019 DTaP/Tdap/Td series (2 - Td) 2/3/2024 Allergies as of 2018  Review Complete On: 2018 By: Johann Parham MD  
  
 Severity Noted Reaction Type Reactions Ciprofibrate  2015    Nausea Only Codeine  2013    Nausea Only Erythromycin  2013    Nausea Only Tetracycline  2013    Nausea Only Tramadol  2013    Other (comments) \"drunk\" Current Immunizations  Reviewed on 3/25/2013 Name Date Influenza Vaccine 2012 Pneumococcal Polysaccharide (PPSV-23) 3/27/2013  4:46 PM  
  
 Not reviewed this visit Vitals BP Pulse Temp Resp Height(growth percentile) Weight(growth percentile) 150/66 (BP 1 Location: Right arm, BP Patient Position: Sitting) 89 97.8 °F (36.6 °C) (Oral) 16 4' 11\" (1.499 m) 98 lb 3.2 oz (44.5 kg) SpO2 BMI OB Status Smoking Status 98% 19.83 kg/m2 Hysterectomy Never Smoker Vitals History BMI and BSA Data Body Mass Index Body Surface Area  
 19.83 kg/m 2 1.36 m 2 Preferred Pharmacy Pharmacy Name Phone [facility], [address] [phone] Your Updated Medication List  
  
   
This list is accurate as of 18  2:16 PM.  Always use your most recent med list.  
  
  
  
  
 * TYLENOL EXTRA STRENGTH 500 mg tablet Generic drug:  acetaminophen Take 500 mg by mouth every four (4) hours as needed for Pain. * acetaminophen 650 mg Tab Take 650 mg by mouth every six (6) hours. amitriptyline 10 mg tablet Commonly known as:  ELAVIL TAKE 1 TABLET TWICE A DAY  
  
 aspirin delayed-release 81 mg tablet Take 81 mg by mouth daily. AZOPT 1 % ophthalmic suspension Generic drug:  brinzolamide Administer 1 Drop to left eye two (2) times a day. CENTRUM SILVER Tab tablet Generic drug:  multivitamins-minerals-lutein Take 1 Tab by mouth daily. ferrous sulfate 142 mg (45 mg iron) ER tablet Commonly known as:  SLOW FE Take 1 Tab by mouth Daily (before breakfast). ipratropium 42 mcg (0.06 %) nasal spray Commonly known as:  ATROVENT  
2 Sprays by Both Nostrils route two (2) times a day. levothyroxine 50 mcg tablet Commonly known as:  SYNTHROID  
TAKE ONE-HALF (1/2) TABLET DAILY BEFORE BREAKFAST LORazepam 1 mg tablet Commonly known as:  ATIVAN  
take 1 tablet by mouth every 12 hours if needed for anxiety  
  
 polyethylene glycol 17 gram/dose powder Commonly known as:  Vicky Mule Take 17 g by mouth daily. raNITIdine 150 mg tablet Commonly known as:  ZANTAC Take 150 mg by mouth daily. senna-docusate 8.6-50 mg per tablet Commonly known as:  Cyrus Raf Take 1 Tab by mouth two (2) times a day. simvastatin 10 mg tablet Commonly known as:  ZOCOR Take 10 mg by mouth nightly. * Notice: This list has 2 medication(s) that are the same as other medications prescribed for you. Read the directions carefully, and ask your doctor or other care provider to review them with you. Follow-up Instructions Return in about 3 months (around 9/4/2018) for Follow-up. Patient Instructions It was a pleasure to see you! As discussed: 
 
Knee Pain Continue care with Dr. Aleja Newton. Also try OTC lidocaine patch (Salon Pas or Asper Creme) Pituitary Mass Complete the labs ordered See Dr. Rufina Crocker as scheduled. Preventing Falls: Care Instructions Your Care Instruction Getting around your home safely can be a challenge if you have injuries or health problems that make it easy for you to fall. Loose rugs and furniture in walkways are among the dangers for many older people who have problems walking or who have poor eyesight. People who have conditions such as arthritis, osteoporosis, or dementia also have to be careful not to fall. You can make your home safer with a few simple measures. Follow-up care is a key part of your treatment and safety. Be sure to make and go to all appointments, and call your doctor if you are having problems. It's also a good idea to know your test results and keep a list of the medicines you take. How can you care for yourself at home? Taking care of yourself · You may get dizzy if you do not drink enough water. To prevent dehydration, drink plenty of fluids, enough so that your urine is light yellow or clear like water. Choose water and other caffeine-free clear liquids. If you have kidney, heart, or liver disease and have to limit fluids, talk with your doctor before you increase the amount of fluids you drink. · Exercise regularly to improve your strength, muscle tone, and balance. Walk if you can. Swimming may be a good choice if you cannot walk easily. · Have your vision and hearing checked each year or any time you notice a change. If you have trouble seeing and hearing, you might not be able to avoid objects and could lose your balance. · Know the side effects of the medicines you take. Ask your doctor or pharmacist whether the medicines you take can affect your balance. Sleeping pills or sedatives can affect your balance. · Limit the amount of alcohol you drink. Alcohol can impair your balance and other senses.  
· Ask your doctor whether calluses or corns on your feet need to be removed. If you wear loose-fitting shoes because of calluses or corns, you can lose your balance and fall. · Talk to your doctor if you have numbness in your feet. Preventing falls at home · Remove raised doorway thresholds, throw rugs, and clutter. Repair loose carpet or raised areas in the floor. · Move furniture and electrical cords to keep them out of walking paths. · Use nonskid floor wax, and wipe up spills right away, especially on ceramic tile floors. · If you use a walker or cane, put rubber tips on it. If you use crutches, clean the bottoms of them regularly with an abrasive pad, such as steel wool. · Keep your house well lit, especially [place], and outside walkways. Use night-lights in areas such as hallways and bathrooms. Add extra light switches or use remote switches (such as switches that go on or off when you clap your hands) to make it easier to turn lights on if you have to get up during the night. · Install sturdy handrails on stairways. · Move items in your cabinets so that the things you use a lot are on the lower shelves (about waist level). · Keep a cordless phone and a flashlight with new batteries by your bed. If possible, put a phone in each of the main rooms of your house, or carry a cell phone in case you fall and cannot reach a phone. Or, you can wear a device around your neck or wrist. You push a button that sends a signal for help. · Wear low-heeled shoes that fit well and give your feet good support. Use footwear with nonskid soles. Check the heels and soles of your shoes for wear. Repair or replace worn heels or soles. · Do not wear socks without shoes on wood floors. · Walk on the grass when the sidewalks are slippery. If you live in an area that gets snow and ice in the winter, sprinkle salt on slippery steps and sidewalks. Preventing falls in the bath · Install grab bars and nonskid mats inside and outside your shower or tub and near the toilet and sinks. · Use shower chairs and bath benches. · Use a hand-held shower head that will allow you to sit while showering. · Get into a tub or shower by putting the weaker leg in first. Get out of a tub or shower with your strong side first. 
· Repair loose toilet seats and consider installing a raised toilet seat to make getting on and off the toilet easier. · Keep your bathroom door unlocked while you are in the shower. Where can you learn more? Go to http://nikolay-kendra.info/. Enter [phone] in the search box to learn more about \"Preventing Falls: Care Instructions. \" Current as of: May 12, 2017 Content Version: 11.4 © 4646-4536 Healthwise, Incorporated. Care instructions adapted under license by [employer] (which disclaims liability or warranty for this information). If you have questions about a medical condition or this instruction, always ask your healthcare professional. [place] [address] any warranty or liability for your use of this information. Introducing [facility]! Fili Regalado introduces Uguru patient portal. Now you can access parts of your medical record, email your doctor's office, and request medication refills online. 1. In your internet browser, go to [email or web address] 2. Click on the First Time User? Click Here link in the Sign In box. You will see the New Member Sign Up page. 3. Enter your [employer]uru Access Code exactly as it appears below. You will not need to use this code after youve completed the sign-up process. If you do not sign up before the expiration date, you must request a new code. · [employer]uru Access Code: [email or web address] Expires: 9/2/2018  1:01 PM 
 
4. Enter the last four digits of your Social Security Number (xxxx) and Date of Birth (mm/dd/yyyy) as indicated and click Submit. You will be taken to the next sign-up page. 5. Create a Definigen ID. This will be your Definigen login ID and cannot be changed, so think of one that is secure and easy to remember. 6. Create a Definigen password. You can change your password at any time. 7. Enter your Password Reset Question and Answer. This can be used at a later time if you forget your password. 8. Enter your e-mail address. You will receive e-mail notification when new information is available in [address] 9. Click Sign Up. You can now view and download portions of your medical record. 10. Click the Download Summary menu link to download a portable copy of your medical information. If you have questions, please visit the Frequently Asked Questions section of the [employer] website. Remember, [employer]inigen is NOT to be used for urgent needs. For medical emergencies, dial 911. Now available from your iPhone and Android! Please provide this summary of care documentation to your next provider. Your primary care clinician is listed as Mike Mendez. If you have any questions after today's visit, please call [phone].

## 2018-06-05 LAB
ACTH PLAS-MCNC: 13.3 PG/ML (ref 7.2–63.3)
CORTIS SERPL-MCNC: 15.4 UG/DL
IGF-I SERPL-MCNC: 44 NG/ML (ref 34–178)
PROLACTIN SERPL-MCNC: 23.7 NG/ML (ref 4.8–23.3)

## 2018-06-07 DIAGNOSIS — E23.6 PITUITARY MASS (HCC): Primary | ICD-10-CM

## 2018-06-07 NOTE — TELEPHONE ENCOUNTER
Please let Traci Se her hormone levels are elevated (prolactin) I recommend you see Endocrinology. Dr. Espinal Duane. A referral has been placed in the system and she will be called. We will check her labs before her appt with Dr. Steph Mccauley. Can you please fax/ mail the result letter to Dr. Edie Noel at [facility] regarding her upcoming appt.

## 2018-06-08 ENCOUNTER — DOCUMENTATION ONLY (OUTPATIENT)
Dept: INTERNAL MEDICINE CLINIC | Age: 83
End: 2018-06-08

## 2018-06-08 NOTE — TELEPHONE ENCOUNTER
Left message for a return call for details , faxed letter to [facility] Dr Cindy Rod

## 2018-06-11 ENCOUNTER — TELEPHONE (OUTPATIENT)
Dept: INTERNAL MEDICINE CLINIC | Age: 83
End: 2018-06-11

## 2018-06-11 NOTE — TELEPHONE ENCOUNTER
Pt does not want to make a appt with Dr Eduardo Rangel due to knee pain. The office has called and stressed to her that Dr Stephanie Caldwell wanted her to make the appt for the Eye exam but she refused due to her knee.  She said she would call back

## 2018-06-13 ENCOUNTER — HOSPITAL ENCOUNTER (OUTPATIENT)
Dept: LAB | Age: 83
Discharge: HOME | End: 2018-06-13
Payer: MEDICARE

## 2018-06-13 PROCEDURE — 87088 URINE BACTERIA CULTURE: CPT

## 2018-06-13 PROCEDURE — 81001 URINALYSIS AUTO W/SCOPE: CPT

## 2018-06-13 PROCEDURE — 87086 URINE CULTURE/COLONY COUNT: CPT

## 2018-06-13 PROCEDURE — 87077 CULTURE AEROBIC IDENTIFY: CPT

## 2018-06-13 PROCEDURE — 87186 SC STD MICRODIL/AGAR DIL: CPT

## 2018-06-14 NOTE — TELEPHONE ENCOUNTER
POA states patient has seen Dr Brooke Lundy with [facility] yesterday and those records should be getting sent over today or tomorrow , states no changes since 2016

## 2018-06-19 LAB
APPEARANCE UR: ABNORMAL
BACTERIA #/AREA URNS HPF: ABNORMAL /[HPF]
BACTERIA UR CULT: ABNORMAL
BILIRUB UR QL STRIP: NEGATIVE
CASTS URNS QL MICRO: ABNORMAL /LPF
COLOR UR: YELLOW
EPI CELLS #/AREA URNS HPF: ABNORMAL /HPF
GLUCOSE UR QL: NEGATIVE
HGB UR QL STRIP: NEGATIVE
KETONES UR QL STRIP: NEGATIVE
LEUKOCYTE ESTERASE UR QL STRIP: ABNORMAL
MICRO URNS: ABNORMAL
MUCOUS THREADS URNS QL MICRO: PRESENT
NITRITE UR QL STRIP: POSITIVE
PH UR STRIP: =>9 [PH] (ref 5–7.5)
PROT UR QL STRIP: ABNORMAL
RBC #/AREA URNS HPF: ABNORMAL /HPF
SP GR UR: 1.01 (ref 1–1.03)
URINALYSIS REFLEX, 377202: ABNORMAL
UROBILINOGEN UR STRIP-MCNC: 0.2 MG/DL (ref 0.2–1)
WBC #/AREA URNS HPF: >30 /HPF

## 2018-06-19 NOTE — PROGRESS NOTES
Positive UA. H/o recurrent UTI followed by Urology. Please send results to urologist for recommendations. notify patient she needs to follow-up with urology ASAP. Please let me know Urology says.

## 2018-06-20 ENCOUNTER — DOCUMENTATION ONLY (OUTPATIENT)
Dept: INTERNAL MEDICINE CLINIC | Age: 83
End: 2018-06-20

## 2018-06-20 NOTE — PROGRESS NOTES
Patient has been informed per drs result notes and recommendations. Patient states her son will be out on vacation for a month and cannot schedule any appts at this time, denies any UTI symptoms .  Contacted Dr Desir's office and informed his nurse and faxed the results over to the office per request.

## 2018-06-25 DIAGNOSIS — F41.9 ANXIETY: ICD-10-CM

## 2018-06-26 ENCOUNTER — DOCUMENTATION ONLY (OUTPATIENT)
Dept: INTERNAL MEDICINE CLINIC | Age: 83
End: 2018-06-26

## 2018-06-26 RX ORDER — LORAZEPAM 1 MG/1
TABLET ORAL
Qty: 180 TAB | Refills: 0 | OUTPATIENT
Start: 2018-06-26

## 2018-06-26 NOTE — PROGRESS NOTES
Patient [address] [facility] has changed to [facility] [place] [id number] still not coming up in system

## 2018-07-27 RX ORDER — LEVOTHYROXINE SODIUM 50 UG/1
TABLET ORAL
Qty: 45 TAB | Refills: 4 | Status: SHIPPED | OUTPATIENT
Start: 2018-07-27

## 2018-08-30 ENCOUNTER — TELEPHONE (OUTPATIENT)
Dept: INTERNAL MEDICINE CLINIC | Age: 83
End: 2018-08-30

## 2018-08-30 NOTE — TELEPHONE ENCOUNTER
Keily Sizer (Self) [phone]     Pt's POA is calling to advise pt being transported to assisted living in [place], [address] [facility] @ [facility]- [facility] [address] [place], [address]   Ph:  [phone].   Has questions on protocol with medications

## 2018-08-30 NOTE — TELEPHONE ENCOUNTER
Patient Jovana Ramírez [facility] should be handling admission to [facility]. She will contact  and  from the facility for additional info. Ms Aubrey Covington states patient may be moving there long term. Patient currently in [facility] for possible stroke since Monday. She will be admitted to [facility] tomorrow.

## 2019-07-30 NOTE — TELEPHONE ENCOUNTER
Patient was on bactrim for a uti, but got very sick from the med. Talked to Dr. Anupam Gauthier on 4/8 and he told her to call if not better (see note). She would like to speak to Dr. John Ryan to see what to do next. Has been so sick from the med that she has not been able to go anywhere. no

## 2022-12-25 NOTE — TELEPHONE ENCOUNTER
printed for review last filled 10/11/2017 # 180 for a 90 day supply
[facility] to see if I am the last prescriber in the date of last prescription for review
Lorazepam 1 mg has been phoned in to pharmacy as prescribed # 180 w 0 refills , pharmacist Jamil Wilson
Ambulatory